# Patient Record
Sex: FEMALE | Race: WHITE | NOT HISPANIC OR LATINO
[De-identification: names, ages, dates, MRNs, and addresses within clinical notes are randomized per-mention and may not be internally consistent; named-entity substitution may affect disease eponyms.]

---

## 2020-11-19 ENCOUNTER — APPOINTMENT (OUTPATIENT)
Dept: FAMILY MEDICINE | Facility: CLINIC | Age: 85
End: 2020-11-19
Payer: MEDICARE

## 2020-11-19 VITALS
SYSTOLIC BLOOD PRESSURE: 132 MMHG | DIASTOLIC BLOOD PRESSURE: 74 MMHG | HEART RATE: 83 BPM | BODY MASS INDEX: 26.2 KG/M2 | HEIGHT: 60.5 IN | WEIGHT: 137 LBS | TEMPERATURE: 97.5 F | OXYGEN SATURATION: 95 %

## 2020-11-19 DIAGNOSIS — Z82.49 FAMILY HISTORY OF ISCHEMIC HEART DISEASE AND OTHER DISEASES OF THE CIRCULATORY SYSTEM: ICD-10-CM

## 2020-11-19 DIAGNOSIS — Z87.39 PERSONAL HISTORY OF OTHER DISEASES OF THE MUSCULOSKELETAL SYSTEM AND CONNECTIVE TISSUE: ICD-10-CM

## 2020-11-19 DIAGNOSIS — Z78.9 OTHER SPECIFIED HEALTH STATUS: ICD-10-CM

## 2020-11-19 PROCEDURE — G0444 DEPRESSION SCREEN ANNUAL: CPT | Mod: 59

## 2020-11-19 PROCEDURE — 99204 OFFICE O/P NEW MOD 45 MIN: CPT | Mod: 25

## 2020-11-19 RX ORDER — VIT C/E/ZN/COPPR/LUTEIN/ZEAXAN 250MG-90MG
CAPSULE ORAL
Refills: 0 | Status: ACTIVE | COMMUNITY

## 2020-11-19 NOTE — HISTORY OF PRESENT ILLNESS
[FreeTextEntry1] : establish care  [de-identified] : 86 year old female with pmhx of HTN, HLD, RA presents to establish care. She states she feels well. Admits to depression due to taking care of her  with dementia. She is unable to get an aide to come to house as it is not covered. She is followed by rheum. \par Also advised she has chronic blood in the urine and had full workup - was advised it is a side effect of humira

## 2020-11-19 NOTE — PLAN
[FreeTextEntry1] : mild depression\par - has tried SSRIs which make her fatigued\par - trial of Wellbutrin\par - f/u in 1 month via phone for update\par \par HTN\par - cont losartan\par \par HLD\par - cont statin\par \par f/u labs\par f/u in 6 months or PRN

## 2020-11-20 LAB
25(OH)D3 SERPL-MCNC: 30.4 NG/ML
ALBUMIN SERPL ELPH-MCNC: 4.1 G/DL
ALP BLD-CCNC: 80 U/L
ALT SERPL-CCNC: 11 U/L
ANION GAP SERPL CALC-SCNC: 12 MMOL/L
AST SERPL-CCNC: 17 U/L
BASOPHILS # BLD AUTO: 0.06 K/UL
BASOPHILS NFR BLD AUTO: 0.7 %
BILIRUB SERPL-MCNC: 0.2 MG/DL
BUN SERPL-MCNC: 22 MG/DL
CALCIUM SERPL-MCNC: 9.7 MG/DL
CHLORIDE SERPL-SCNC: 102 MMOL/L
CHOLEST SERPL-MCNC: 153 MG/DL
CO2 SERPL-SCNC: 29 MMOL/L
CREAT SERPL-MCNC: 1 MG/DL
EOSINOPHIL # BLD AUTO: 0.37 K/UL
EOSINOPHIL NFR BLD AUTO: 4.1 %
ESTIMATED AVERAGE GLUCOSE: 120 MG/DL
GLUCOSE SERPL-MCNC: 113 MG/DL
HBA1C MFR BLD HPLC: 5.8 %
HCT VFR BLD CALC: 41.7 %
HDLC SERPL-MCNC: 52 MG/DL
HGB BLD-MCNC: 13.3 G/DL
IMM GRANULOCYTES NFR BLD AUTO: 0.3 %
LDLC SERPL CALC-MCNC: 64 MG/DL
LYMPHOCYTES # BLD AUTO: 3.6 K/UL
LYMPHOCYTES NFR BLD AUTO: 39.9 %
MAN DIFF?: NORMAL
MCHC RBC-ENTMCNC: 29.6 PG
MCHC RBC-ENTMCNC: 31.9 GM/DL
MCV RBC AUTO: 92.9 FL
MONOCYTES # BLD AUTO: 0.78 K/UL
MONOCYTES NFR BLD AUTO: 8.6 %
NEUTROPHILS # BLD AUTO: 4.18 K/UL
NEUTROPHILS NFR BLD AUTO: 46.4 %
NONHDLC SERPL-MCNC: 101 MG/DL
PLATELET # BLD AUTO: 204 K/UL
POTASSIUM SERPL-SCNC: 4.2 MMOL/L
PROT SERPL-MCNC: 7 G/DL
RBC # BLD: 4.49 M/UL
RBC # FLD: 13.1 %
SODIUM SERPL-SCNC: 143 MMOL/L
TRIGL SERPL-MCNC: 186 MG/DL
TSH SERPL-ACNC: 2.28 UIU/ML
WBC # FLD AUTO: 9.02 K/UL

## 2021-01-05 ENCOUNTER — NON-APPOINTMENT (OUTPATIENT)
Age: 86
End: 2021-01-05

## 2021-01-05 ENCOUNTER — APPOINTMENT (OUTPATIENT)
Dept: FAMILY MEDICINE | Facility: CLINIC | Age: 86
End: 2021-01-05
Payer: MEDICARE

## 2021-01-05 VITALS
HEART RATE: 85 BPM | SYSTOLIC BLOOD PRESSURE: 126 MMHG | WEIGHT: 137 LBS | OXYGEN SATURATION: 96 % | BODY MASS INDEX: 26.2 KG/M2 | DIASTOLIC BLOOD PRESSURE: 72 MMHG | TEMPERATURE: 97.6 F | HEIGHT: 60.5 IN

## 2021-01-05 DIAGNOSIS — G25.0 ESSENTIAL TREMOR: ICD-10-CM

## 2021-01-05 DIAGNOSIS — Z13.31 ENCOUNTER FOR SCREENING FOR DEPRESSION: ICD-10-CM

## 2021-01-05 PROCEDURE — G0444 DEPRESSION SCREEN ANNUAL: CPT | Mod: 59

## 2021-01-05 PROCEDURE — 99214 OFFICE O/P EST MOD 30 MIN: CPT | Mod: 25

## 2021-01-05 PROCEDURE — 99072 ADDL SUPL MATRL&STAF TM PHE: CPT

## 2021-01-05 PROCEDURE — 93000 ELECTROCARDIOGRAM COMPLETE: CPT | Mod: 59

## 2021-01-05 NOTE — PHYSICAL EXAM
[No Acute Distress] : no acute distress [Well-Appearing] : well-appearing [Normal Sclera/Conjunctiva] : normal sclera/conjunctiva [PERRL] : pupils equal round and reactive to light [Normal Outer Ear/Nose] : the outer ears and nose were normal in appearance [Normal TMs] : both tympanic membranes were normal [No Respiratory Distress] : no respiratory distress  [No Accessory Muscle Use] : no accessory muscle use [Clear to Auscultation] : lungs were clear to auscultation bilaterally [Normal Rate] : normal rate  [Regular Rhythm] : with a regular rhythm [Soft] : abdomen soft [Non Tender] : non-tender [Non-distended] : non-distended [Normal Bowel Sounds] : normal bowel sounds [No Rash] : no rash [No Focal Deficits] : no focal deficits [Normal Affect] : the affect was normal [Normal Insight/Judgement] : insight and judgment were intact [de-identified] : + hand tremor bl

## 2021-01-05 NOTE — PLAN
[FreeTextEntry1] : HTN\par - cont current meds\par \par Depression\par - cont wellbutrin 300mg\par \par Essential tremor\par - start low dose propanolol 10mg daily\par \par RBBB\par - found on EKG\par - no previous EKG for comparison\par - cardio referral\par \par f/u in 3 months or PRN

## 2021-01-05 NOTE — HISTORY OF PRESENT ILLNESS
[FreeTextEntry1] : tremors, depression [de-identified] : 86 year old female presents for f/u. Was started on wellbutrin 300mg and feels improvement. She still feel she could feel better, but notices a difference. She has been on medication for 6 weeks. Denies side effects. She does complain of intermittent tremors for past few months. Denies etoh use. No liver failure.\par  She is taking care of her  with dementia and has increased stress from situation

## 2021-02-11 ENCOUNTER — APPOINTMENT (OUTPATIENT)
Dept: FAMILY MEDICINE | Facility: CLINIC | Age: 86
End: 2021-02-11
Payer: MEDICARE

## 2021-02-11 VITALS
BODY MASS INDEX: 26.2 KG/M2 | SYSTOLIC BLOOD PRESSURE: 134 MMHG | HEIGHT: 60.5 IN | WEIGHT: 137 LBS | DIASTOLIC BLOOD PRESSURE: 70 MMHG | TEMPERATURE: 97.4 F | OXYGEN SATURATION: 94 % | RESPIRATION RATE: 16 BRPM | HEART RATE: 93 BPM

## 2021-02-11 DIAGNOSIS — R63.0 ANOREXIA: ICD-10-CM
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
## 2021-02-11 PROCEDURE — 99072 ADDL SUPL MATRL&STAF TM PHE: CPT

## 2021-02-11 PROCEDURE — 99213 OFFICE O/P EST LOW 20 MIN: CPT

## 2021-02-11 NOTE — PLAN
[FreeTextEntry1] : RA\par - trial of medrol dose bianca\par - advised if no improvement to f/u wit her rheumatologist\par \par Lack of appetite\par - possibly due to pain\par - f/u if no improvement\par - consider AE of wellbutrin if persists

## 2021-02-11 NOTE — PHYSICAL EXAM
[No Acute Distress] : no acute distress [Well-Appearing] : well-appearing [Normal Sclera/Conjunctiva] : normal sclera/conjunctiva [PERRL] : pupils equal round and reactive to light [Normal Outer Ear/Nose] : the outer ears and nose were normal in appearance [No Respiratory Distress] : no respiratory distress  [No Accessory Muscle Use] : no accessory muscle use [Clear to Auscultation] : lungs were clear to auscultation bilaterally [Normal Rate] : normal rate  [Regular Rhythm] : with a regular rhythm [Soft] : abdomen soft [Non Tender] : non-tender [Normal Bowel Sounds] : normal bowel sounds [Non-distended] : non-distended [No Joint Swelling] : no joint swelling [Normal Affect] : the affect was normal [Normal Insight/Judgement] : insight and judgment were intact [de-identified] : walks with walker

## 2021-02-11 NOTE — HISTORY OF PRESENT ILLNESS
[FreeTextEntry8] : 86 year old female presents complaining of diffuse joint pain and an RA flareup. States it all started after a fall 1 week ago. She denies htiting her head. She fell forward and landed on her arms. Had gloves on so did not get scratches. Had 2 men help her up immediately. She states falls have triggered RA flares for her in the past and she typically improves with steroids. She is on humira and has a rheumatologist she sees. Complains of lack of appetite since fall as well due to pain

## 2021-02-12 ENCOUNTER — NON-APPOINTMENT (OUTPATIENT)
Age: 86
End: 2021-02-12

## 2021-02-22 ENCOUNTER — NON-APPOINTMENT (OUTPATIENT)
Age: 86
End: 2021-02-22

## 2021-02-23 ENCOUNTER — APPOINTMENT (OUTPATIENT)
Dept: FAMILY MEDICINE | Facility: CLINIC | Age: 86
End: 2021-02-23

## 2021-02-25 ENCOUNTER — APPOINTMENT (OUTPATIENT)
Dept: FAMILY MEDICINE | Facility: CLINIC | Age: 86
End: 2021-02-25
Payer: MEDICARE

## 2021-02-25 PROCEDURE — 99443: CPT

## 2021-02-25 RX ORDER — METHYLPREDNISOLONE 4 MG/1
4 TABLET ORAL
Qty: 1 | Refills: 0 | Status: COMPLETED | COMMUNITY
Start: 2021-02-11 | End: 2021-02-25

## 2021-02-25 RX ORDER — TIZANIDINE 4 MG/1
4 TABLET ORAL
Refills: 0 | Status: COMPLETED | COMMUNITY
End: 2021-02-25

## 2021-02-25 RX ORDER — PROPRANOLOL HYDROCHLORIDE 10 MG/1
10 TABLET ORAL DAILY
Qty: 90 | Refills: 0 | Status: COMPLETED | COMMUNITY
Start: 2021-01-05 | End: 2021-02-25

## 2021-02-25 RX ORDER — PRAVASTATIN SODIUM 40 MG/1
40 TABLET ORAL DAILY
Qty: 3 | Refills: 0 | Status: COMPLETED | COMMUNITY
End: 2021-02-25

## 2021-02-25 RX ORDER — BUPROPION HYDROCHLORIDE 300 MG/1
300 TABLET, EXTENDED RELEASE ORAL
Qty: 90 | Refills: 0 | Status: COMPLETED | COMMUNITY
Start: 2020-11-19 | End: 2021-02-25

## 2021-02-25 NOTE — PLAN
[FreeTextEntry1] : Hospital f/u\par - cont to monitor spO2 off of oxygen\par - monitor symptoms \par - recommend PT to help build up strength\par - can consider [facility] referral\par \par Bereavement\par - pt just lost her \par - advised to f/u if she feels she need medication to help cope\par - may benefit from therapy as well\par

## 2021-02-25 NOTE — HISTORY OF PRESENT ILLNESS
[FreeTextEntry1] : hospital followup for covid [de-identified] : 86 year old female presents for f/u via phone call. She was recently discharged from [facility] for [facility] where she waws admitted for 6 days. She was not on a respirator and required O2.\par States during her hospitalization her  passed away. She has had difficulty processing this as she is home recovering herself. \par States she has O2 at home but her spO2 is 97 off of oxygen. \par Was supposed to have home RN and PT but states noone showed up. \par She is overall improving day by day. She is trying to build up her strength

## 2021-03-08 ENCOUNTER — NON-APPOINTMENT (OUTPATIENT)
Age: 86
End: 2021-03-08

## 2021-03-09 DIAGNOSIS — J12.82 COVID-19: ICD-10-CM

## 2021-03-09 DIAGNOSIS — U07.1 COVID-19: ICD-10-CM

## 2021-03-29 ENCOUNTER — APPOINTMENT (OUTPATIENT)
Dept: FAMILY MEDICINE | Facility: CLINIC | Age: 86
End: 2021-03-29
Payer: MEDICARE

## 2021-03-29 VITALS
TEMPERATURE: 97.2 F | WEIGHT: 130 LBS | HEART RATE: 101 BPM | SYSTOLIC BLOOD PRESSURE: 124 MMHG | OXYGEN SATURATION: 95 % | BODY MASS INDEX: 24.87 KG/M2 | DIASTOLIC BLOOD PRESSURE: 72 MMHG | HEIGHT: 60.5 IN

## 2021-03-29 DIAGNOSIS — H91.90 UNSPECIFIED HEARING LOSS, UNSPECIFIED EAR: ICD-10-CM

## 2021-03-29 DIAGNOSIS — H61.21 IMPACTED CERUMEN, RIGHT EAR: ICD-10-CM

## 2021-03-29 DIAGNOSIS — Z12.83 ENCOUNTER FOR SCREENING FOR MALIGNANT NEOPLASM OF SKIN: ICD-10-CM

## 2021-03-29 PROCEDURE — 99072 ADDL SUPL MATRL&STAF TM PHE: CPT

## 2021-03-29 PROCEDURE — 99214 OFFICE O/P EST MOD 30 MIN: CPT

## 2021-03-29 NOTE — HISTORY OF PRESENT ILLNESS
[FreeTextEntry1] : f/u [de-identified] : 86 year old female presents for f/u. She complains of  generalized weakness since her hospitalization from covid last month and would like PT.\par  She also has inquiries about starting humira and the covid-19 vaccination\par She complains of difficulty hearing, has f/u wiith audiologist to evaluate hearing aids\par  She also has concerns of skin lesions on face. \par She complains of anxiety and depression since losing her  and having to deal with financial decisions coming up. She is interested in medication and therapy

## 2021-03-29 NOTE — PLAN
[FreeTextEntry1] : HTN\par - controlled\par - cont medication\par \par Depression\par - start lexapro\par - f/u with behavioral health for therapy\par \par Impacted cerumen\par - tried to irrigate ears without success. Advised debrox at home for a few weeks. Can follow back up in office afterwards for evaluation and irrigation\par \par Hearing loss\par - possibly related to cerumen impaction\par - f/u with audiologist to evaluate hearing aids\par \par Hx of covid-19, weakness\par - PT referral \par \par Skin lesions\par - derm referral\par \par f/u in 3 months or PRN

## 2021-03-29 NOTE — PHYSICAL EXAM
[No Acute Distress] : no acute distress [Well-Appearing] : well-appearing [Normal Sclera/Conjunctiva] : normal sclera/conjunctiva [PERRL] : pupils equal round and reactive to light [Normal Outer Ear/Nose] : the outer ears and nose were normal in appearance [No Respiratory Distress] : no respiratory distress  [No Accessory Muscle Use] : no accessory muscle use [Clear to Auscultation] : lungs were clear to auscultation bilaterally [Normal Rate] : normal rate  [Regular Rhythm] : with a regular rhythm [Normal Affect] : the affect was normal [Normal Insight/Judgement] : insight and judgment were intact [de-identified] : + cerumen impaction on R

## 2021-04-05 ENCOUNTER — APPOINTMENT (OUTPATIENT)
Dept: NEUROLOGY | Facility: CLINIC | Age: 86
End: 2021-04-05

## 2021-04-08 ENCOUNTER — NON-APPOINTMENT (OUTPATIENT)
Age: 86
End: 2021-04-08

## 2021-04-23 ENCOUNTER — TRANSCRIPTION ENCOUNTER (OUTPATIENT)
Age: 86
End: 2021-04-23

## 2021-04-27 ENCOUNTER — TRANSCRIPTION ENCOUNTER (OUTPATIENT)
Age: 86
End: 2021-04-27

## 2021-05-11 ENCOUNTER — TRANSCRIPTION ENCOUNTER (OUTPATIENT)
Age: 86
End: 2021-05-11

## 2021-05-17 ENCOUNTER — APPOINTMENT (OUTPATIENT)
Dept: FAMILY MEDICINE | Facility: CLINIC | Age: 86
End: 2021-05-17
Payer: MEDICARE

## 2021-05-17 DIAGNOSIS — J06.9 ACUTE UPPER RESPIRATORY INFECTION, UNSPECIFIED: ICD-10-CM

## 2021-05-17 PROCEDURE — 99442: CPT

## 2021-05-17 RX ORDER — SERTRALINE 25 MG/1
25 TABLET, FILM COATED ORAL DAILY
Qty: 45 | Refills: 0 | Status: COMPLETED | COMMUNITY
Start: 2021-04-29 | End: 2021-05-17

## 2021-05-17 NOTE — HISTORY OF PRESENT ILLNESS
[Home] : at home, [unfilled] , at the time of the visit. [Medical Office: ([facility])___] : at the medical office located in  [Verbal consent obtained from patient] : the patient, [unfilled] [de-identified] : 86 year old female presents via telephone for f/u. She is still struggling since being discharged from the hospital for covid and losing her  at the same time. She has been doing PT and has still not been able to get back to her "precovid" self. \par She has been suffering from depression and has been taking to mental health counselor from our office which is helping. They have decided not to start medication.\par She complains of diffuse joint pain and is followed by rheumatologist. Was feeling better on prednisone but once it stops she starts to have pain again. \par She also complains of a cold with runny nose. States she is starting to feel better from this. Has questions about getting the covid-19 vaccination, she is 90 days out from her covid infection

## 2021-05-17 NOTE — PLAN
[FreeTextEntry1] : URI\par - advised to continue supportive measures including rest, salt water gargles, tea with honey\par - Flonase daily\par - f/u if no improvement or worsening symptoms\par - would recommend waiting on covid-19 vaccination until she is feeling better\par \par Bereavement\par - cont f/u with counseling\par \par RA\par - advised to f/u with rheum to discuss medication options\par \par Hx of covid\par - advised pt to be patient with herself.\par - cont PT and advance activities as tolerated

## 2021-05-17 NOTE — REVIEW OF SYSTEMS
[Fever] : no fever [Chills] : no chills [Fatigue] : fatigue [Discharge] : no discharge [Vision Problems] : no vision problems [Earache] : no earache [Nasal Discharge] : nasal discharge [Sore Throat] : no sore throat [Postnasal Drip] : postnasal drip [Chest Pain] : no chest pain [Palpitations] : no palpitations [Shortness Of Breath] : no shortness of breath [Wheezing] : no wheezing [Cough] : no cough [Joint Pain] : joint pain [Depression] : depression

## 2021-05-24 ENCOUNTER — TRANSCRIPTION ENCOUNTER (OUTPATIENT)
Age: 86
End: 2021-05-24

## 2021-06-03 ENCOUNTER — TRANSCRIPTION ENCOUNTER (OUTPATIENT)
Age: 86
End: 2021-06-03

## 2021-06-08 ENCOUNTER — TRANSCRIPTION ENCOUNTER (OUTPATIENT)
Age: 86
End: 2021-06-08

## 2021-06-21 ENCOUNTER — RX RENEWAL (OUTPATIENT)
Age: 86
End: 2021-06-21

## 2021-06-22 ENCOUNTER — TRANSCRIPTION ENCOUNTER (OUTPATIENT)
Age: 86
End: 2021-06-22

## 2021-06-29 ENCOUNTER — TRANSCRIPTION ENCOUNTER (OUTPATIENT)
Age: 86
End: 2021-06-29

## 2021-07-13 ENCOUNTER — TRANSCRIPTION ENCOUNTER (OUTPATIENT)
Age: 86
End: 2021-07-13

## 2021-07-19 ENCOUNTER — APPOINTMENT (OUTPATIENT)
Dept: FAMILY MEDICINE | Facility: CLINIC | Age: 86
End: 2021-07-19
Payer: MEDICARE

## 2021-07-19 VITALS
HEIGHT: 60.5 IN | OXYGEN SATURATION: 95 % | BODY MASS INDEX: 25.44 KG/M2 | WEIGHT: 133 LBS | HEART RATE: 102 BPM | TEMPERATURE: 97 F | DIASTOLIC BLOOD PRESSURE: 74 MMHG | SYSTOLIC BLOOD PRESSURE: 124 MMHG

## 2021-07-19 DIAGNOSIS — M54.6 PAIN IN THORACIC SPINE: ICD-10-CM

## 2021-07-19 PROCEDURE — 99072 ADDL SUPL MATRL&STAF TM PHE: CPT

## 2021-07-19 PROCEDURE — 99213 OFFICE O/P EST LOW 20 MIN: CPT

## 2021-07-19 RX ORDER — CLONAZEPAM 0.25 MG/1
0.25 TABLET, ORALLY DISINTEGRATING ORAL
Qty: 14 | Refills: 0 | Status: COMPLETED | COMMUNITY
Start: 2021-06-24 | End: 2021-07-19

## 2021-07-19 RX ORDER — ESCITALOPRAM OXALATE 10 MG/1
10 TABLET ORAL
Qty: 90 | Refills: 0 | Status: COMPLETED | COMMUNITY
Start: 2021-03-29 | End: 2021-07-19

## 2021-07-19 NOTE — PHYSICAL EXAM
[No Acute Distress] : no acute distress [Well-Appearing] : well-appearing [Normal Sclera/Conjunctiva] : normal sclera/conjunctiva [PERRL] : pupils equal round and reactive to light [Normal Outer Ear/Nose] : the outer ears and nose were normal in appearance [No Respiratory Distress] : no respiratory distress  [No Accessory Muscle Use] : no accessory muscle use [Clear to Auscultation] : lungs were clear to auscultation bilaterally [Normal Rate] : normal rate  [Regular Rhythm] : with a regular rhythm [Soft] : abdomen soft [Non Tender] : non-tender [Non-distended] : non-distended [Normal Bowel Sounds] : normal bowel sounds [No Rash] : no rash [No Focal Deficits] : no focal deficits [Normal Affect] : the affect was normal [Normal Insight/Judgement] : insight and judgment were intact [de-identified] : neg murphys sign

## 2021-07-19 NOTE — HISTORY OF PRESENT ILLNESS
[FreeTextEntry8] : 86 year old female presents for concern for gallstones as she had them in the past. Patient states her pain is in her upper back, localized and non radiating. States it throbs. Improved with tylenol. Also improves after laying flat when sleeping. No pain in the morning and worsens as the day goes on. Does not worsen with PO intake. She does have a hx of arthritis and is followed by rheum. Spoke to rheum today who gave her some oxycodone to use. She also has increased fatigue and leg pain but improves a little every day. Can do one activity before needing a break

## 2021-08-31 ENCOUNTER — APPOINTMENT (OUTPATIENT)
Dept: FAMILY MEDICINE | Facility: CLINIC | Age: 86
End: 2021-08-31
Payer: MEDICARE

## 2021-08-31 VITALS
HEIGHT: 60.5 IN | TEMPERATURE: 97.3 F | SYSTOLIC BLOOD PRESSURE: 128 MMHG | DIASTOLIC BLOOD PRESSURE: 80 MMHG | OXYGEN SATURATION: 96 % | BODY MASS INDEX: 25.24 KG/M2 | HEART RATE: 111 BPM | WEIGHT: 132 LBS

## 2021-08-31 PROCEDURE — 99214 OFFICE O/P EST MOD 30 MIN: CPT

## 2021-08-31 NOTE — PLAN
[FreeTextEntry1] : Weakness/difficulty ambulating\par - note provided for pt to get out of lease. Do believe it would benefit pt to live with daughter\par \par Labs\par - to be followed at [facility]\par \par Edema\par - f/u at next visit after labs done

## 2021-08-31 NOTE — PHYSICAL EXAM
[Normal Sclera/Conjunctiva] : normal sclera/conjunctiva [EOMI] : extraocular movements intact [No JVD] : no jugular venous distention [No Lymphadenopathy] : no lymphadenopathy [Supple] : supple [Normal Rate] : normal rate  [Regular Rhythm] : with a regular rhythm [Normal S1, S2] : normal S1 and S2 [No Carotid Bruits] : no carotid bruits [Normal] : normal gait, coordination grossly intact, no focal deficits and deep tendon reflexes were 2+ and symmetric [de-identified] : fine bibasilar crackles posteriorly [de-identified] : + holosystolic murmur [de-identified] : trace-1+ pitting edema of bilateral LE

## 2021-08-31 NOTE — HISTORY OF PRESENT ILLNESS
[FreeTextEntry1] : Follow up [de-identified] : 88yo F, with PMH/o , presenting with inability to walk that has persisted since her hospitalization in 2/2021 due to COVID-19. She would like to move in with her daughter for additional assistance.Has been having difficulty ambulating and requires a walker. Her bathroom is also not handicap accessible. \par Also, patient notes that since her hospitalization she has been prescribed losartan where she prior to admission she had been taking combined ARB-HCTZ for ~20yrs. She now feels that she is retaining fluid again around her ankles.

## 2021-09-16 LAB
BASOPHILS # BLD AUTO: 0.08 K/UL
BASOPHILS NFR BLD AUTO: 0.9 %
EOSINOPHIL # BLD AUTO: 0.87 K/UL
EOSINOPHIL NFR BLD AUTO: 9.8 %
HCT VFR BLD CALC: 40.1 %
HGB BLD-MCNC: 12.9 G/DL
IMM GRANULOCYTES NFR BLD AUTO: 0.3 %
LYMPHOCYTES # BLD AUTO: 3.12 K/UL
LYMPHOCYTES NFR BLD AUTO: 35.1 %
MAN DIFF?: NORMAL
MCHC RBC-ENTMCNC: 29.1 PG
MCHC RBC-ENTMCNC: 32.2 GM/DL
MCV RBC AUTO: 90.5 FL
MONOCYTES # BLD AUTO: 0.74 K/UL
MONOCYTES NFR BLD AUTO: 8.3 %
NEUTROPHILS # BLD AUTO: 4.06 K/UL
NEUTROPHILS NFR BLD AUTO: 45.6 %
PLATELET # BLD AUTO: 225 K/UL
RBC # BLD: 4.43 M/UL
RBC # FLD: 14.6 %
WBC # FLD AUTO: 8.9 K/UL

## 2021-09-17 LAB
25(OH)D3 SERPL-MCNC: 28.8 NG/ML
ALBUMIN SERPL ELPH-MCNC: 4.2 G/DL
ALP BLD-CCNC: 61 U/L
ALT SERPL-CCNC: 11 U/L
ANION GAP SERPL CALC-SCNC: 20 MMOL/L
AST SERPL-CCNC: 16 U/L
BILIRUB SERPL-MCNC: 0.4 MG/DL
BUN SERPL-MCNC: 17 MG/DL
CALCIUM SERPL-MCNC: 8.9 MG/DL
CHLORIDE SERPL-SCNC: 102 MMOL/L
CHOLEST SERPL-MCNC: 182 MG/DL
CO2 SERPL-SCNC: 21 MMOL/L
CREAT SERPL-MCNC: 0.9 MG/DL
ESTIMATED AVERAGE GLUCOSE: 117 MG/DL
GLUCOSE SERPL-MCNC: 100 MG/DL
HBA1C MFR BLD HPLC: 5.7 %
HDLC SERPL-MCNC: 58 MG/DL
LDLC SERPL CALC-MCNC: 99 MG/DL
NONHDLC SERPL-MCNC: 124 MG/DL
POTASSIUM SERPL-SCNC: 4.1 MMOL/L
PROT SERPL-MCNC: 6.8 G/DL
SODIUM SERPL-SCNC: 143 MMOL/L
TRIGL SERPL-MCNC: 124 MG/DL
TSH SERPL-ACNC: 1.5 UIU/ML

## 2021-09-20 ENCOUNTER — RX RENEWAL (OUTPATIENT)
Age: 86
End: 2021-09-20

## 2021-09-26 ENCOUNTER — INPATIENT (INPATIENT)
Facility: HOSPITAL | Age: 86
LOS: 6 days | Discharge: HOME HEALTH | DRG: 871 | End: 2021-10-03
Attending: INTERNAL MEDICINE | Admitting: STUDENT IN AN ORGANIZED HEALTH CARE EDUCATION/TRAINING PROGRAM
Payer: MEDICARE

## 2021-09-26 VITALS
HEART RATE: 107 BPM | DIASTOLIC BLOOD PRESSURE: 60 MMHG | HEIGHT: 65 IN | WEIGHT: 134.92 LBS | RESPIRATION RATE: 18 BRPM | TEMPERATURE: 100 F | SYSTOLIC BLOOD PRESSURE: 128 MMHG | OXYGEN SATURATION: 93 %

## 2021-09-26 DIAGNOSIS — N12 TUBULO-INTERSTITIAL NEPHRITIS, NOT SPECIFIED AS ACUTE OR CHRONIC: ICD-10-CM

## 2021-09-26 LAB
ADD ON TEST-SPECIMEN IN LAB: SIGNIFICANT CHANGE UP
ALBUMIN SERPL ELPH-MCNC: 3.4 G/DL — SIGNIFICANT CHANGE UP (ref 3.3–5)
ALP SERPL-CCNC: 75 U/L — SIGNIFICANT CHANGE UP (ref 40–120)
ALT FLD-CCNC: 23 U/L — SIGNIFICANT CHANGE UP (ref 12–78)
ANION GAP SERPL CALC-SCNC: 6 MMOL/L — SIGNIFICANT CHANGE UP (ref 5–17)
AST SERPL-CCNC: 24 U/L — SIGNIFICANT CHANGE UP (ref 15–37)
BASOPHILS # BLD AUTO: 0 K/UL — SIGNIFICANT CHANGE UP (ref 0–0.2)
BASOPHILS NFR BLD AUTO: 0 % — SIGNIFICANT CHANGE UP (ref 0–2)
BILIRUB SERPL-MCNC: 0.7 MG/DL — SIGNIFICANT CHANGE UP (ref 0.2–1.2)
BUN SERPL-MCNC: 20 MG/DL — SIGNIFICANT CHANGE UP (ref 7–23)
CALCIUM SERPL-MCNC: 9.2 MG/DL — SIGNIFICANT CHANGE UP (ref 8.5–10.1)
CHLORIDE SERPL-SCNC: 100 MMOL/L — SIGNIFICANT CHANGE UP (ref 96–108)
CO2 SERPL-SCNC: 28 MMOL/L — SIGNIFICANT CHANGE UP (ref 22–31)
CREAT SERPL-MCNC: 1.06 MG/DL — SIGNIFICANT CHANGE UP (ref 0.5–1.3)
EOSINOPHIL # BLD AUTO: 0 K/UL — SIGNIFICANT CHANGE UP (ref 0–0.5)
EOSINOPHIL NFR BLD AUTO: 0 % — SIGNIFICANT CHANGE UP (ref 0–6)
GLUCOSE SERPL-MCNC: 132 MG/DL — HIGH (ref 70–99)
HCT VFR BLD CALC: 41.2 % — SIGNIFICANT CHANGE UP (ref 34.5–45)
HGB BLD-MCNC: 13.4 G/DL — SIGNIFICANT CHANGE UP (ref 11.5–15.5)
LYMPHOCYTES # BLD AUTO: 1.38 K/UL — SIGNIFICANT CHANGE UP (ref 1–3.3)
LYMPHOCYTES # BLD AUTO: 6 % — LOW (ref 13–44)
MAGNESIUM SERPL-MCNC: 1.8 MG/DL — SIGNIFICANT CHANGE UP (ref 1.6–2.6)
MCHC RBC-ENTMCNC: 29 PG — SIGNIFICANT CHANGE UP (ref 27–34)
MCHC RBC-ENTMCNC: 32.5 GM/DL — SIGNIFICANT CHANGE UP (ref 32–36)
MCV RBC AUTO: 89.2 FL — SIGNIFICANT CHANGE UP (ref 80–100)
MONOCYTES # BLD AUTO: 2.76 K/UL — HIGH (ref 0–0.9)
MONOCYTES NFR BLD AUTO: 12 % — SIGNIFICANT CHANGE UP (ref 2–14)
NEUTROPHILS # BLD AUTO: 18.65 K/UL — HIGH (ref 1.8–7.4)
NEUTROPHILS NFR BLD AUTO: 79 % — HIGH (ref 43–77)
NRBC # BLD: SIGNIFICANT CHANGE UP /100 WBCS (ref 0–0)
PLATELET # BLD AUTO: 189 K/UL — SIGNIFICANT CHANGE UP (ref 150–400)
POTASSIUM SERPL-MCNC: 3.7 MMOL/L — SIGNIFICANT CHANGE UP (ref 3.5–5.3)
POTASSIUM SERPL-SCNC: 3.7 MMOL/L — SIGNIFICANT CHANGE UP (ref 3.5–5.3)
PROT SERPL-MCNC: 7.9 GM/DL — SIGNIFICANT CHANGE UP (ref 6–8.3)
RBC # BLD: 4.62 M/UL — SIGNIFICANT CHANGE UP (ref 3.8–5.2)
RBC # FLD: 14.2 % — SIGNIFICANT CHANGE UP (ref 10.3–14.5)
SODIUM SERPL-SCNC: 134 MMOL/L — LOW (ref 135–145)
TROPONIN I SERPL-MCNC: 0.03 NG/ML — SIGNIFICANT CHANGE UP (ref 0.01–0.04)
TROPONIN I SERPL-MCNC: 0.03 NG/ML — SIGNIFICANT CHANGE UP (ref 0.01–0.04)
WBC # BLD: 23.02 K/UL — HIGH (ref 3.8–10.5)
WBC # FLD AUTO: 23.02 K/UL — HIGH (ref 3.8–10.5)

## 2021-09-26 PROCEDURE — 85027 COMPLETE CBC AUTOMATED: CPT

## 2021-09-26 PROCEDURE — 74177 CT ABD & PELVIS W/CONTRAST: CPT | Mod: 26,MA

## 2021-09-26 PROCEDURE — 93010 ELECTROCARDIOGRAM REPORT: CPT

## 2021-09-26 PROCEDURE — 97116 GAIT TRAINING THERAPY: CPT | Mod: GP

## 2021-09-26 PROCEDURE — 71045 X-RAY EXAM CHEST 1 VIEW: CPT | Mod: 26

## 2021-09-26 PROCEDURE — 86769 SARS-COV-2 COVID-19 ANTIBODY: CPT

## 2021-09-26 PROCEDURE — 99285 EMERGENCY DEPT VISIT HI MDM: CPT

## 2021-09-26 PROCEDURE — 83036 HEMOGLOBIN GLYCOSYLATED A1C: CPT

## 2021-09-26 PROCEDURE — 85730 THROMBOPLASTIN TIME PARTIAL: CPT

## 2021-09-26 PROCEDURE — 87150 DNA/RNA AMPLIFIED PROBE: CPT

## 2021-09-26 PROCEDURE — 80048 BASIC METABOLIC PNL TOTAL CA: CPT

## 2021-09-26 PROCEDURE — 97162 PT EVAL MOD COMPLEX 30 MIN: CPT | Mod: GP

## 2021-09-26 PROCEDURE — 97530 THERAPEUTIC ACTIVITIES: CPT | Mod: GP

## 2021-09-26 PROCEDURE — 80053 COMPREHEN METABOLIC PANEL: CPT

## 2021-09-26 PROCEDURE — 36415 COLL VENOUS BLD VENIPUNCTURE: CPT

## 2021-09-26 PROCEDURE — 85610 PROTHROMBIN TIME: CPT

## 2021-09-26 PROCEDURE — 85025 COMPLETE CBC W/AUTO DIFF WBC: CPT

## 2021-09-26 PROCEDURE — 83605 ASSAY OF LACTIC ACID: CPT

## 2021-09-26 PROCEDURE — 87040 BLOOD CULTURE FOR BACTERIA: CPT

## 2021-09-26 PROCEDURE — 93306 TTE W/DOPPLER COMPLETE: CPT

## 2021-09-26 PROCEDURE — 87186 SC STD MICRODIL/AGAR DIL: CPT

## 2021-09-26 PROCEDURE — 80061 LIPID PANEL: CPT

## 2021-09-26 RX ORDER — CEFTRIAXONE 500 MG/1
1000 INJECTION, POWDER, FOR SOLUTION INTRAMUSCULAR; INTRAVENOUS ONCE
Refills: 0 | Status: DISCONTINUED | OUTPATIENT
Start: 2021-09-26 | End: 2021-09-26

## 2021-09-26 RX ORDER — SODIUM CHLORIDE 9 MG/ML
1000 INJECTION INTRAMUSCULAR; INTRAVENOUS; SUBCUTANEOUS ONCE
Refills: 0 | Status: COMPLETED | OUTPATIENT
Start: 2021-09-26 | End: 2021-09-26

## 2021-09-26 RX ORDER — CEFTRIAXONE 500 MG/1
1000 INJECTION, POWDER, FOR SOLUTION INTRAMUSCULAR; INTRAVENOUS ONCE
Refills: 0 | Status: COMPLETED | OUTPATIENT
Start: 2021-09-26 | End: 2021-09-26

## 2021-09-26 RX ORDER — ACETAMINOPHEN 500 MG
1000 TABLET ORAL ONCE
Refills: 0 | Status: COMPLETED | OUTPATIENT
Start: 2021-09-26 | End: 2021-09-26

## 2021-09-26 RX ORDER — ONDANSETRON 8 MG/1
4 TABLET, FILM COATED ORAL EVERY 6 HOURS
Refills: 0 | Status: DISCONTINUED | OUTPATIENT
Start: 2021-09-26 | End: 2021-10-03

## 2021-09-26 RX ORDER — ACETAMINOPHEN 500 MG
650 TABLET ORAL EVERY 6 HOURS
Refills: 0 | Status: DISCONTINUED | OUTPATIENT
Start: 2021-09-26 | End: 2021-10-03

## 2021-09-26 RX ADMIN — Medication 1000 MILLIGRAM(S): at 22:40

## 2021-09-26 RX ADMIN — SODIUM CHLORIDE 1000 MILLILITER(S): 9 INJECTION INTRAMUSCULAR; INTRAVENOUS; SUBCUTANEOUS at 21:35

## 2021-09-26 NOTE — ED ADULT TRIAGE NOTE - CHIEF COMPLAINT QUOTE
Pt with c/o of weakness x 2 days.  Pt states she has been feeling weak while walking and having to lower herself to ground to avoid falling.  Also states she has not been eating/drinking normally the past couple days.  Endorses multiple episodes of diarrhea.

## 2021-09-26 NOTE — ED PROVIDER NOTE - PROGRESS NOTE DETAILS
JG:  Received signout from Dr. Patterson to follow up CT which showed cystitis, ureteritis and likely pyelonephritis.  Patient given rocephin already for empiric antibiotic.  Vitals stable.  2nd L of NSS infusing.  Patient to be admitted to hospitalist Dr. Mendoza.  Discussed case and patient is approved to move.

## 2021-09-26 NOTE — ED PROVIDER NOTE - OBJECTIVE STATEMENT
87 year old female with PMHx of HTN, COVID, high cholesterol, arthritis on Humira presents to the ED c/o generalized weakness. Pt reports she that over the last 2 days she has been feeling weakness to the point where she has to lower herself to the ground and cannot get back up. Pt reports eating less, but is still drinking fluids. Pt states that she has had bowel and bladder accidents because she has felt so weak to the point that she cannot make it to the bathroom in time. Denies HA, CP, abd pain, n/v/d, fever. Pt is COVID vaccinated.

## 2021-09-26 NOTE — ED ADULT NURSE NOTE - OBJECTIVE STATEMENT
Pt presents to er with complaints of generalized weakness and not feeling well for past 2 days, pt reports she was on the floor for 9hrs yesterday and was able to make it back to her bed, states she was on the floor today and was not able to get up at all needing an ambulance, pt reports history of COVID and lost her  to COVID and lives alone, pt denies chest pain, sob, dizziness at this time, safety maintained with stretcher in lowest position, will continue to monitor.

## 2021-09-26 NOTE — ED ADULT NURSE REASSESSMENT NOTE - NS ED NURSE REASSESS COMMENT FT1
Received pt from Mellisa GENAO. Pt A+Ox4, requesting tylenol for "arthritis pain". Dr. Yesenia blanco.

## 2021-09-26 NOTE — ED PROVIDER NOTE - ENMT, MLM
Airway patent, Nasal mucosa clear. Mouth with dry mucous membranes. Throat has no vesicles, no oropharyngeal exudates and uvula is midline. Airway patent, Nasal mucosa clear. Mouth with dry mucous membranes.

## 2021-09-27 ENCOUNTER — APPOINTMENT (OUTPATIENT)
Dept: FAMILY MEDICINE | Facility: CLINIC | Age: 86
End: 2021-09-27

## 2021-09-27 LAB
A1C WITH ESTIMATED AVERAGE GLUCOSE RESULT: 5.8 % — HIGH (ref 4–5.6)
ALBUMIN SERPL ELPH-MCNC: 2.8 G/DL — LOW (ref 3.3–5)
ALP SERPL-CCNC: 65 U/L — SIGNIFICANT CHANGE UP (ref 40–120)
ALT FLD-CCNC: 19 U/L — SIGNIFICANT CHANGE UP (ref 12–78)
ANION GAP SERPL CALC-SCNC: 8 MMOL/L — SIGNIFICANT CHANGE UP (ref 5–17)
APPEARANCE UR: ABNORMAL
APTT BLD: 31.2 SEC — SIGNIFICANT CHANGE UP (ref 27.5–35.5)
AST SERPL-CCNC: 19 U/L — SIGNIFICANT CHANGE UP (ref 15–37)
BASOPHILS # BLD AUTO: 0 K/UL — SIGNIFICANT CHANGE UP (ref 0–0.2)
BASOPHILS NFR BLD AUTO: 0 % — SIGNIFICANT CHANGE UP (ref 0–2)
BILIRUB SERPL-MCNC: 0.5 MG/DL — SIGNIFICANT CHANGE UP (ref 0.2–1.2)
BILIRUB UR-MCNC: NEGATIVE — SIGNIFICANT CHANGE UP
BUN SERPL-MCNC: 17 MG/DL — SIGNIFICANT CHANGE UP (ref 7–23)
CALCIUM SERPL-MCNC: 7.8 MG/DL — LOW (ref 8.5–10.1)
CHLORIDE SERPL-SCNC: 105 MMOL/L — SIGNIFICANT CHANGE UP (ref 96–108)
CHOLEST SERPL-MCNC: 121 MG/DL — SIGNIFICANT CHANGE UP
CO2 SERPL-SCNC: 24 MMOL/L — SIGNIFICANT CHANGE UP (ref 22–31)
COLOR SPEC: YELLOW — SIGNIFICANT CHANGE UP
COVID-19 SPIKE DOMAIN AB INTERP: POSITIVE
COVID-19 SPIKE DOMAIN ANTIBODY RESULT: 199 U/ML — HIGH
CREAT SERPL-MCNC: 0.94 MG/DL — SIGNIFICANT CHANGE UP (ref 0.5–1.3)
DIFF PNL FLD: ABNORMAL
E COLI DNA BLD POS QL NAA+NON-PROBE: SIGNIFICANT CHANGE UP
EOSINOPHIL # BLD AUTO: 0 K/UL — SIGNIFICANT CHANGE UP (ref 0–0.5)
EOSINOPHIL NFR BLD AUTO: 0 % — SIGNIFICANT CHANGE UP (ref 0–6)
ESTIMATED AVERAGE GLUCOSE: 120 MG/DL — HIGH (ref 68–114)
GLUCOSE SERPL-MCNC: 104 MG/DL — HIGH (ref 70–99)
GLUCOSE UR QL: NEGATIVE MG/DL — SIGNIFICANT CHANGE UP
GRAM STN FLD: SIGNIFICANT CHANGE UP
GRAM STN FLD: SIGNIFICANT CHANGE UP
HCT VFR BLD CALC: 36 % — SIGNIFICANT CHANGE UP (ref 34.5–45)
HDLC SERPL-MCNC: 55 MG/DL — SIGNIFICANT CHANGE UP
HGB BLD-MCNC: 11.7 G/DL — SIGNIFICANT CHANGE UP (ref 11.5–15.5)
INR BLD: 1.4 RATIO — HIGH (ref 0.88–1.16)
KETONES UR-MCNC: NEGATIVE — SIGNIFICANT CHANGE UP
LACTATE SERPL-SCNC: 1.1 MMOL/L — SIGNIFICANT CHANGE UP (ref 0.7–2)
LEUKOCYTE ESTERASE UR-ACNC: ABNORMAL
LIPID PNL WITH DIRECT LDL SERPL: 48 MG/DL — SIGNIFICANT CHANGE UP
LYMPHOCYTES # BLD AUTO: 1.31 K/UL — SIGNIFICANT CHANGE UP (ref 1–3.3)
LYMPHOCYTES # BLD AUTO: 6 % — LOW (ref 13–44)
MCHC RBC-ENTMCNC: 29.1 PG — SIGNIFICANT CHANGE UP (ref 27–34)
MCHC RBC-ENTMCNC: 32.5 GM/DL — SIGNIFICANT CHANGE UP (ref 32–36)
MCV RBC AUTO: 89.6 FL — SIGNIFICANT CHANGE UP (ref 80–100)
METHOD TYPE: SIGNIFICANT CHANGE UP
MONOCYTES # BLD AUTO: 1.52 K/UL — HIGH (ref 0–0.9)
MONOCYTES NFR BLD AUTO: 7 % — SIGNIFICANT CHANGE UP (ref 2–14)
NEUTROPHILS # BLD AUTO: 18.94 K/UL — HIGH (ref 1.8–7.4)
NEUTROPHILS NFR BLD AUTO: 87 % — HIGH (ref 43–77)
NITRITE UR-MCNC: POSITIVE
NON HDL CHOLESTEROL: 66 MG/DL — SIGNIFICANT CHANGE UP
NRBC # BLD: SIGNIFICANT CHANGE UP /100 WBCS (ref 0–0)
PH UR: 6.5 — SIGNIFICANT CHANGE UP (ref 5–8)
PLATELET # BLD AUTO: 171 K/UL — SIGNIFICANT CHANGE UP (ref 150–400)
POTASSIUM SERPL-MCNC: 3.2 MMOL/L — LOW (ref 3.5–5.3)
POTASSIUM SERPL-SCNC: 3.2 MMOL/L — LOW (ref 3.5–5.3)
PROT SERPL-MCNC: 6.5 GM/DL — SIGNIFICANT CHANGE UP (ref 6–8.3)
PROT UR-MCNC: 100 MG/DL
PROTHROM AB SERPL-ACNC: 16 SEC — HIGH (ref 10.6–13.6)
RBC # BLD: 4.02 M/UL — SIGNIFICANT CHANGE UP (ref 3.8–5.2)
RBC # FLD: 14.3 % — SIGNIFICANT CHANGE UP (ref 10.3–14.5)
SARS-COV-2 IGG+IGM SERPL QL IA: 199 U/ML — HIGH
SARS-COV-2 IGG+IGM SERPL QL IA: POSITIVE
SARS-COV-2 RNA SPEC QL NAA+PROBE: SIGNIFICANT CHANGE UP
SODIUM SERPL-SCNC: 137 MMOL/L — SIGNIFICANT CHANGE UP (ref 135–145)
SP GR SPEC: 1 — LOW (ref 1.01–1.02)
SPECIMEN SOURCE: SIGNIFICANT CHANGE UP
SPECIMEN SOURCE: SIGNIFICANT CHANGE UP
TRIGL SERPL-MCNC: 89 MG/DL — SIGNIFICANT CHANGE UP
UROBILINOGEN FLD QL: NEGATIVE MG/DL — SIGNIFICANT CHANGE UP
WBC # BLD: 21.77 K/UL — HIGH (ref 3.8–10.5)
WBC # FLD AUTO: 21.77 K/UL — HIGH (ref 3.8–10.5)

## 2021-09-27 PROCEDURE — 99223 1ST HOSP IP/OBS HIGH 75: CPT | Mod: GC

## 2021-09-27 PROCEDURE — 93306 TTE W/DOPPLER COMPLETE: CPT | Mod: 26

## 2021-09-27 PROCEDURE — 12345: CPT | Mod: NC,GC

## 2021-09-27 RX ORDER — LOSARTAN POTASSIUM 100 MG/1
0 TABLET, FILM COATED ORAL
Qty: 0 | Refills: 0 | DISCHARGE

## 2021-09-27 RX ORDER — ADALIMUMAB 40MG/0.8ML
0 KIT SUBCUTANEOUS
Qty: 0 | Refills: 0 | DISCHARGE

## 2021-09-27 RX ORDER — AZITHROMYCIN 500 MG/1
500 TABLET, FILM COATED ORAL EVERY 24 HOURS
Refills: 0 | Status: DISCONTINUED | OUTPATIENT
Start: 2021-09-27 | End: 2021-09-27

## 2021-09-27 RX ORDER — CEFTRIAXONE 500 MG/1
1000 INJECTION, POWDER, FOR SOLUTION INTRAMUSCULAR; INTRAVENOUS EVERY 24 HOURS
Refills: 0 | Status: DISCONTINUED | OUTPATIENT
Start: 2021-09-27 | End: 2021-09-28

## 2021-09-27 RX ORDER — POTASSIUM CHLORIDE 20 MEQ
40 PACKET (EA) ORAL ONCE
Refills: 0 | Status: COMPLETED | OUTPATIENT
Start: 2021-09-27 | End: 2021-09-27

## 2021-09-27 RX ORDER — ATORVASTATIN CALCIUM 80 MG/1
10 TABLET, FILM COATED ORAL AT BEDTIME
Refills: 0 | Status: DISCONTINUED | OUTPATIENT
Start: 2021-09-27 | End: 2021-10-03

## 2021-09-27 RX ORDER — ENOXAPARIN SODIUM 100 MG/ML
40 INJECTION SUBCUTANEOUS DAILY
Refills: 0 | Status: DISCONTINUED | OUTPATIENT
Start: 2021-09-27 | End: 2021-10-03

## 2021-09-27 RX ORDER — CEFTRIAXONE 500 MG/1
INJECTION, POWDER, FOR SOLUTION INTRAMUSCULAR; INTRAVENOUS
Refills: 0 | Status: DISCONTINUED | OUTPATIENT
Start: 2021-09-27 | End: 2021-09-27

## 2021-09-27 RX ORDER — SODIUM CHLORIDE 9 MG/ML
1000 INJECTION INTRAMUSCULAR; INTRAVENOUS; SUBCUTANEOUS
Refills: 0 | Status: DISCONTINUED | OUTPATIENT
Start: 2021-09-27 | End: 2021-09-27

## 2021-09-27 RX ORDER — LANOLIN ALCOHOL/MO/W.PET/CERES
3 CREAM (GRAM) TOPICAL AT BEDTIME
Refills: 0 | Status: DISCONTINUED | OUTPATIENT
Start: 2021-09-27 | End: 2021-10-03

## 2021-09-27 RX ORDER — SODIUM CHLORIDE 9 MG/ML
1000 INJECTION INTRAMUSCULAR; INTRAVENOUS; SUBCUTANEOUS
Refills: 0 | Status: DISCONTINUED | OUTPATIENT
Start: 2021-09-27 | End: 2021-09-30

## 2021-09-27 RX ORDER — CEFTRIAXONE 500 MG/1
1000 INJECTION, POWDER, FOR SOLUTION INTRAMUSCULAR; INTRAVENOUS ONCE
Refills: 0 | Status: COMPLETED | OUTPATIENT
Start: 2021-09-27 | End: 2021-09-27

## 2021-09-27 RX ORDER — LOSARTAN POTASSIUM 100 MG/1
25 TABLET, FILM COATED ORAL DAILY
Refills: 0 | Status: DISCONTINUED | OUTPATIENT
Start: 2021-09-27 | End: 2021-10-01

## 2021-09-27 RX ORDER — INFLUENZA VIRUS VACCINE 15; 15; 15; 15 UG/.5ML; UG/.5ML; UG/.5ML; UG/.5ML
0.5 SUSPENSION INTRAMUSCULAR ONCE
Refills: 0 | Status: DISCONTINUED | OUTPATIENT
Start: 2021-09-27 | End: 2021-10-03

## 2021-09-27 RX ADMIN — ATORVASTATIN CALCIUM 10 MILLIGRAM(S): 80 TABLET, FILM COATED ORAL at 22:00

## 2021-09-27 RX ADMIN — SODIUM CHLORIDE 75 MILLILITER(S): 9 INJECTION INTRAMUSCULAR; INTRAVENOUS; SUBCUTANEOUS at 22:45

## 2021-09-27 RX ADMIN — SODIUM CHLORIDE 75 MILLILITER(S): 9 INJECTION INTRAMUSCULAR; INTRAVENOUS; SUBCUTANEOUS at 17:56

## 2021-09-27 RX ADMIN — Medication 650 MILLIGRAM(S): at 16:05

## 2021-09-27 RX ADMIN — Medication 650 MILLIGRAM(S): at 20:26

## 2021-09-27 RX ADMIN — Medication 650 MILLIGRAM(S): at 20:56

## 2021-09-27 RX ADMIN — Medication 40 MILLIEQUIVALENT(S): at 08:48

## 2021-09-27 RX ADMIN — ENOXAPARIN SODIUM 40 MILLIGRAM(S): 100 INJECTION SUBCUTANEOUS at 08:48

## 2021-09-27 RX ADMIN — LOSARTAN POTASSIUM 25 MILLIGRAM(S): 100 TABLET, FILM COATED ORAL at 08:48

## 2021-09-27 RX ADMIN — SODIUM CHLORIDE 75 MILLILITER(S): 9 INJECTION INTRAMUSCULAR; INTRAVENOUS; SUBCUTANEOUS at 08:49

## 2021-09-27 RX ADMIN — CEFTRIAXONE 1000 MILLIGRAM(S): 500 INJECTION, POWDER, FOR SOLUTION INTRAMUSCULAR; INTRAVENOUS at 02:33

## 2021-09-27 RX ADMIN — SODIUM CHLORIDE 1000 MILLILITER(S): 9 INJECTION INTRAMUSCULAR; INTRAVENOUS; SUBCUTANEOUS at 02:33

## 2021-09-27 RX ADMIN — Medication 650 MILLIGRAM(S): at 05:20

## 2021-09-27 RX ADMIN — CEFTRIAXONE 1000 MILLIGRAM(S): 500 INJECTION, POWDER, FOR SOLUTION INTRAMUSCULAR; INTRAVENOUS at 17:57

## 2021-09-27 RX ADMIN — Medication 650 MILLIGRAM(S): at 15:58

## 2021-09-27 NOTE — H&P ADULT - NSHPSOCIALHISTORY_GEN_ALL_CORE
Patient is retired and lives alone at apartment, with regular visits from family, after   of COVID in . She is planning on moving in with one of her daughters.

## 2021-09-27 NOTE — H&P ADULT - HISTORY OF PRESENT ILLNESS
Patient is an 87 year old female, PMH of COVID, HTN, HLD rheumatoid arthritis on humira and predinsone, c/o weakness and fatigue. She endorses generalized fatigue since COVID infection in 2020, but also notes that she has residual weakness from the infx, that is more prominent in the extremities, necessitating a walker for ambulation and also causing her to have bm and urinary accidents because she cannot make it to the bathroom in time. Patient was at baseline until two days ago ambulating around her apartment, when she experienced sudden onset bilateral motor weakness of the lower extremities, and fell to the floor. No alleviating or aggravating factors. She did not hit or head, or notice any other antecedent or consequent trauma. Patient denies all other symptoms including ha, syncope, palpitations, sensory changes, pain or change of temperature in the lower extremities, tongue-biting or loss of control/changes in BM or urination. She lives alone and endorses having to "drag herself around all day" using her arms, until the weakness spontaneously resolved later that day. She has never symptoms like this before. When patient experienced similar episode the next day, she presented to ED for evaluation of weakness.    In the ED, patient was found to be slightly febrile at 99.6 w/ HR at 107, and elevated WBC at 23.02. Creatine kinase is 403; Urinalysis is positive for nitrites, LE, WBC, bacteria TNTC and large blood. CT abdomen showed Cystitis with bilateral ureteritis, possibly pyelonephritis. Patient was given NS bolus + dose of ceftriaxone and will be admitted for further workup. Patient is an 87 year old female, PMH of COVID, HTN, HLD rheumatoid arthritis on humira and predinsone, c/o weakness and fatigue. She endorses generalized fatigue since COVID infection in 2020, but also notes that she has residual weakness from the infx, that is more prominent in the extremities, necessitating a walker for ambulation and also causing her to have bm and urinary accidents because she cannot make it to the bathroom in time. Patient was at baseline until two days ago ambulating around her apartment, when she experienced sudden onset bilateral motor weakness of the lower extremities, and fell to the floor. No alleviating or aggravating factors. She did not hit or head, or notice any other antecedent or consequent trauma. Patient denies all other symptoms including ha, syncope, palpitations, sensory changes, pain or change of temperature in the lower extremities, tongue-biting or loss of control/changes in BM or urination. She lives alone and endorses having to "drag herself around all day" using her arms, until the weakness spontaneously resolved later that day. She has never symptoms like this before. When patient experienced similar episode the next day, patient's daughter encourage her to go to hospital and she was brought in by ambulance to ED for evaluation of weakness.    In the ED, patient was found to be slightly febrile at 99.6 w/ HR at 107, and elevated WBC at 23.02. Creatine kinase is 403; Urinalysis is positive for nitrites, LE, WBC, bacteria TNTC and large blood. CT abdomen showed Cystitis with bilateral ureteritis, possibly pyelonephritis. Patient was given NS bolus + dose of ceftriaxone and will be admitted for further workup. Patient is an 87 year old female, PMH of COVID, HTN, HLD rheumatoid arthritis on humira and predinsone, c/o weakness and fatigue. She endorses generalized fatigue since COVID infection in 2020, but also notes that she has residual weakness from the infx, that is more prominent in the extremities, necessitating a walker for ambulation and also causing her to have bm and urinary accidents because she cannot make it to the bathroom in time. Patient was at baseline until two days ago ambulating around her apartment, when she experienced sudden onset bilateral motor weakness of the lower extremities, and fell to the floor. No alleviating or aggravating factors. She did not hit or head, or notice any other antecedent or consequent trauma. Patient denies all other symptoms including ha, syncope, palpitations, sensory changes, pain or change of temperature in the lower extremities, tongue-biting or loss of control/changes in BM or urination. She lives alone and endorses having to "drag herself around all day" using her arms, until the weakness spontaneously resolved later that day. She has never symptoms like this before. When patient experienced similar episode the next day, patient's daughter encouraged her to go to hospital and she was brought in by ambulance to ED for evaluation of weakness.    In the ED, patient was found to be slightly febrile at 99.6 w/ HR at 107, and elevated WBC at 23.02. Creatine kinase is 403; Urinalysis is positive for nitrites, LE, WBC, bacteria TNTC and large blood. CT abdomen showed Cystitis with bilateral ureteritis, possibly pyelonephritis. Patient was given NS bolus + dose of ceftriaxone and will be admitted for further workup. Patient is an 87 year old female, PMH of COVID, HTN, HLD rheumatoid arthritis on humira and predinsone, c/o weakness and fatigue. She endorses generalized fatigue since COVID infection in early 2021, but also notes that she has residual weakness from the infx, that is more prominent in the extremities, necessitating a walker for ambulation and also causing her to have bm and urinary accidents because she cannot make it to the bathroom in time. Patient was at baseline until two days ago ambulating around her apartment, when she experienced sudden onset bilateral motor weakness of the lower extremities, and fell to the floor. No alleviating or aggravating factors. She did not hit her head, or notice any other antecedent or consequent trauma. Patient denies all other symptoms including ha, syncope, palpitations, sensory changes, pain or change of temperature in the lower extremities, tongue-biting or loss of control/changes in BM or urination. She lives alone and endorses having to "drag herself around all day" using her arms, until the weakness spontaneously resolved later that day. She has never symptoms like this before. When patient experienced similar episode the next day, patient's daughter encouraged her to go to hospital and she was brought in by ambulance to ED for evaluation of weakness.    In the ED, patient was found to be slightly febrile at 99.6 w/ HR at 107, and elevated WBC at 23.02. Creatine kinase is 403; Urinalysis is positive for nitrites, LE, WBC, bacteria TNTC and large blood. CT abdomen showed Cystitis with bilateral ureteritis, possibly pyelonephritis. Patient was given NS bolus + dose of ceftriaxone and will be admitted for further workup. Patient is an 87 year old female, PMH of COVID, HTN, HLD rheumatoid arthritis on humira and predinsone, c/o weakness and fatigue. During the interview, patient is slightly confused and AAOx2. She endorses generalized fatigue since COVID infection in early 2021, but also notes that she has residual weakness from the infx, that is more prominent in the extremities, necessitating a walker for ambulation and also causing her to have bm and urinary accidents because she cannot make it to the bathroom in time. Patient was at baseline until two days ago ambulating around her apartment, when she experienced episode of bilateral motor weakness of the lower extremities, and fell to the floor. No alleviating or aggravating factors. She did not hit her head, or notice any other antecedent or consequent trauma. Patient denies all other symptoms including ha, syncope, palpitations, sensory changes, pain or change of temperature in the lower extremities, tongue-biting or loss of control/changes in BM or urination. She lives alone and endorses having to "drag herself around all day" using her arms, until the weakness spontaneously resolved later that day. She has never symptoms like this before. When patient experienced similar episode the next day, patient's daughter encouraged her to go to hospital and she was brought in by ambulance to ED for evaluation of weakness.    In the ED, patient was found to be slightly febrile at 99.6 w/ HR at 107, and elevated WBC at 23.02. Creatine kinase is 403; Urinalysis is positive for nitrites, LE, WBC, bacteria TNTC and large blood. CT abdomen showed Cystitis with bilateral ureteritis, possibly pyelonephritis. Patient was given NS 2 L bolus + dose of ceftriaxone and tylenol, and will be admitted for further workup.

## 2021-09-27 NOTE — PROGRESS NOTE ADULT - ASSESSMENT
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      # Acute metabolic encephalopathy ,Leukocytosis   -Under investigation. Probably during due to infection  -Blood and urine culture pending   -Iv ceftriaxone  -ID referal  -We will closely monitor her    # Hyperglycemia   -Pre diabetic, Hba1c ordered-5.8  -We will continue to monitor the glucose closely     # Elevated CK   - , will likely improve with IVF    - Not high enough to be rhabdomyolysis or statin induced myopathy  - Will continue to monitor her CK     # Systolic ejection murmur   - ECHO results  - normal,  check above     #HTN   -Stable- on losartan     #HLD  -On atorvostatin    # RA   -Stable on  Humira and Prednisone     #DVT ppx: Lovenox 40 mg subcutaneous daily     Code status: Will  contact her family and complete MOLST.     Case was discussed with Dr. Ely

## 2021-09-27 NOTE — H&P ADULT - NSICDXPASTMEDICALHX_GEN_ALL_CORE_FT
PAST MEDICAL HISTORY:  Rheumatoid arthritis       Arthritis     COVID-19     High cholesterol     HTN (hypertension)

## 2021-09-27 NOTE — H&P ADULT - NSHPPHYSICALEXAM_GEN_ALL_CORE
Constitutional: Pt lying in bed, awake and alert, NAD  HEENT: EOMI, normal hearing, moist mucous membranes  Neck: Soft and supple, no JVD  Respiratory: CTABL, No wheezing, rales or rhonchi  Cardiovascular: S1S2+, RRR, slightly tachycardic, murmur was appreciated  Gastrointestinal: BS+, soft, NT/ND, no guarding, no rebound. No costovertebral tenderness.  Extremities: No peripheral edema  Vascular: 2+ peripheral pulses  Neurological: AAOx3, no focal deficits  Musculoskeletal: 4/5 strength b/l upper and lower extremities, some tremors when squeezing fingers.  Skin: No rashes Constitutional: Pt lying in bed, awake and alert, NAD, slightly confused  HEENT: EOMI, normal hearing, moist mucous membranes  Neck: Soft and supple, no JVD  Respiratory: CTABL, No wheezing, rales or rhonchi  Cardiovascular: S1S2+, RRR, slightly tachycardic, murmur was appreciated  Gastrointestinal: BS+, soft, NT/ND, no guarding, no rebound. No costovertebral tenderness.  Extremities: No peripheral edema  Vascular: 2+ peripheral pulses  Neurological: AAOx2, no focal deficits  Musculoskeletal: 4/5 strength b/l upper and lower extremities, some tremors when squeezing fingers.  Skin: No rashes

## 2021-09-27 NOTE — H&P ADULT - ASSESSMENT
Patient is an 87 year old female, PMH of COVID, HTN, HLD rheumatoid arthritis on humira and predinsone, c/o weakness and fatigue. She is admitted for:    #Acute onset b/l weakness possibly 2/2 covid neuropathy /myopathy vs TIA vs immune-mediated vasculitis (less likely)  - Patient endorses fatigue and residual extremity weakness after COVID infection in 2020.  - Sudden onset episode of b/l LE weakness x2, likely resolved  - WBC at 23.02, CK elevated at 403, urine with WBC's and RBC's.  - NIH stroke scale 0, however due to presenting symptoms and hypercoagulability associated w/ COVID, should r/o stroke/TIA  - Consider MR/MRA neck/head/ brain, and ALE  - Consider neuro and cardio consults  - Neurochecks q 4 hrs  - Fall and aspiration precautions  - F/u HbA1c and lipid panel  - Started on ASA, c/w home statin  - Dash Diet  - PT consulted    #Cystitis + b/l ureteritis, possible pyelonephritis  - Patient with frequent bladder accidents, - for costovertebral tenderness  - Positive SIRS criteria (2/4) w/ WBC at 23 and HR at 107.  - Source likely to be urinary tract  - Cystitis + b/l ureteritis, possible pyelonephritis (less likely)  - C/w fluids  - Rocephin started in ED; will continue and add azithromycin  - F/u blood and urine cultures  - Continue to monitor labs  - ID consult    #HDL  - Monitor labs  - Continue with statin    #HTN  - Monitor pressures  - DASH diet  - Continue with lisinopril    #Rheumatoid arthritis  - C/w humira injections  - C/w PRN prednisone for acute flares    #CODE STATUS  - DNR/DNI    #Dispo  - Home with home care, following w/u    Case discussed with Dr. Mendoza Patient is an 87 year old female, PMH of COVID, HTN, HLD rheumatoid arthritis on humira and predinsone, c/o weakness and fatigue. She is admitted for:    #Acute onset b/l weakness possibly 2/2 covid neuropathy /myopathy vs TIA vs immune-mediated vasculitis (less likely)  - Patient endorses fatigue and residual extremity weakness after COVID infection in 2020.  - Sudden onset episode of b/l LE weakness x2, likely resolved  - WBC at 23.02, CK elevated at 403, urine with WBC's and RBC's.  - Rhabdo and statin-induced myositis less likely d/t no reported muscle pain  - NIH stroke scale 0, however due to presenting symptoms and hypercoagulability associated w/ COVID, should r/o stroke/TIA  - Consider MR/MRA neck/head/ brain, and ALE  - Consider neuro and cardio consults  - Neurochecks q 4 hrs  - Fall and aspiration precautions  - F/u HbA1c and lipid panel  - Started on ASA, c/w home statin  - Dash Diet  - PT consulted    #Cystitis + b/l ureteritis, possible pyelonephritis  - Patient with frequent bladder accidents, - for costovertebral tenderness  - Positive SIRS criteria (2/4) w/ WBC at 23 and HR at 107.  - Source likely to be urinary tract  - CT abdomen positive for cystitis + b/l ureteritis, possible pyelonephritis (less likely)  - C/w fluids  - Rocephin started in ED; will continue and add azithromycin, as infection is currently uncomplicated and pt has no previous hospitalizations for UTI or recent recurrent UTI hx.  - F/u blood and urine cultures  - Continue to monitor labs  - ID consult    #HDL  - Monitor labs  - Continue with statin    #HTN  - Monitor pressures  - DASH diet  - Continue with lisinopril    #Rheumatoid arthritis  - C/w humira injections  - C/w PRN prednisone for acute flares    #CODE STATUS  - DNR/DNI    #Dispo  - Home with home care, following w/u    Case discussed with Dr. Mendoza Patient is an 87 year old female, PMH of COVID, HTN, HLD rheumatoid arthritis on humira and predinsone, c/o weakness and fatigue. She is admitted for:    #Acute Metabolic Encephalopathy 2/2 acute sepsis  #Cystitis + b/l ureteritis, possible pyelonephritis  - Patient is slightly confused, w/AAOx2 a/w cc of fatigue and weakness  - Episode of b/l LE weakness x2, likely resolved  - Patient with frequent bladder accidents, - for costovertebral tenderness  - Meets sepsis criteria (w/ 2/4 SIRS): WBC at 23 and HR at 107. Source likely to be urinary tract  - CK elevated at 403, Urine + for LE, nitrites, WBC's, bacteria TNTC  - CT abdomen positive for cystitis + b/l ureteritis, possible pyelonephritis (less likely)  - Rhabdo and statin-induced myositis less likely d/t no reported muscle pain  - NIH stroke scale at 0, with stroke unlikely at this time. If symptoms worsen, could consider MRI head a/w neuro consult  - NS bolus given in ED, will continue on NS at 75 mL/min  - Rocephin started in ED; will continue as infection is currently uncomplicated and pt has no previous hospitalizations for UTI or recent recurrent UTI hx.  - F/u blood and urine cultures  - Continue to monitor labs  - Fall precautions  - PT consulted    #HDL  - Monitor labs  - Continue with statin    #HTN  - Monitor pressures  - DASH diet  - Continue with lisinopril    #Rheumatoid arthritis  - C/w humira injections  - C/w PRN prednisone for acute flares    #CODE STATUS  - Full code for now, patient amenable to further discussion together w/two daughters in the AM.    #Dispo  - Home with home care, following w/u    Case discussed with Dr. Mendoza Patient is an 87 year old female, PMH of COVID, HTN, HLD rheumatoid arthritis on humira and predinsone, c/o weakness and fatigue. She is admitted for:    #Acute Metabolic Encephalopathy 2/2 acute sepsis  #Cystitis + b/l ureteritis, possible pyelonephritis  - Patient is slightly confused, w/AAOx2 a/w cc of fatigue and weakness  - Episode of b/l LE weakness x2, likely resolved  - Patient with frequent bladder accidents, - for costovertebral tenderness, but murmur found on exam  - Meets sepsis criteria (w/ 2/4 SIRS): WBC at 23 and HR at 107. Source likely to be urinary tract  - CK elevated at 403, Urine + for LE, nitrites, WBC's, bacteria TNTC  - CT abdomen positive for cystitis + b/l ureteritis, possible pyelonephritis (less likely)  - Rhabdo and statin-induced myositis less likely d/t no reported muscle pain  - NIH stroke scale at 0, with stroke unlikely at this time. If symptoms worsen, could consider MRI head a/w neuro consult  - NS bolus given in ED, will continue on NS at 75 mL/min  - Rocephin started in ED; will continue as infection is currently uncomplicated and pt has no previous hospitalizations for UTI or recent recurrent UTI hx.  - F/u blood and urine cultures  - F/u TTE  - Continue to monitor labs  - Fall precautions  - PT consulted    #HDL  - Monitor labs  - Continue with statin    #HTN  - Monitor pressures  - DASH diet  - Continue with lisinopril    #Rheumatoid arthritis  - C/w humira injections  - C/w PRN prednisone for acute flares    #CODE STATUS  - Full code for now, patient amenable to further discussion together w/two daughters in the AM.    #Dispo  - Home with home care, following w/u    Case discussed with Dr. Mendoza

## 2021-09-27 NOTE — H&P ADULT - NSHPLABSRESULTS_GEN_ALL_CORE
LABS: All Labs Reviewed:                        13.4    )-----------( 189      ( 26 Sep 2021 21:24 )             41.2         134<L>  |  100  |  20  ----------------------------<  132<H>  3.7   |  28  |  1.06    Ca    9.2      26 Sep 2021 21:24  Mg     1.8         TPro  7.9  /  Alb  3.4  /  TBili  0.7  /  DBili  x   /  AST  24  /  ALT  23  /  AlkPhos  75        CARDIAC MARKERS ( 26 Sep 2021 22:46 )  0.028 ng/mL / x     / x     / x     / x      CARDIAC MARKERS ( 26 Sep 2021 21:24 )  0.033 ng/mL / x     / 403 U/L / x     / x    Urinalysis Basic - ( 26 Sep 2021 23:26 )    Color: Yellow / Appearance: very cloudy / S.005 / pH: x  Gluc: x / Ketone: Negative  / Bili: Negative / Urobili: Negative mg/dL   Blood: x / Protein: 100 mg/dL / Nitrite: Positive   Leuk Esterase: Moderate / RBC: 25-50 /HPF / WBC >50   Sq Epi: x / Non Sq Epi: Few / Bacteria: TNTC    IMAGING    CXR: Wet read shows no acute changes    < from: CT Abdomen and Pelvis w/ IV Cont (21 @ 23:01) >  Cystitis with bilateral ureteritis. Correlate clinically for pyelonephritis  No convincing focal bowel wall thickening.  Diffuse colonic diverticulosis.  < end of copied text >

## 2021-09-27 NOTE — H&P ADULT - ATTENDING COMMENTS
Resident history as documented below reviewed. Please see resident H&P for full details regarding the service. Patient seen and examined at the bedside. 88 y/o F presents for generalized weakness.     ER course: Temp 99.6F, , SpO2 93% on room air. Labs: WBC 23.02, Na 134, Glucose 132, . UA: very cloudy, positive nitrite, moderate leukocyte esterase, large blood, WBC >50, RBC 25-50, Bacteria TNTC. COVID negative. EKG: sinus tachycardia  bpm, QTC prolonged 491 ms, RBBB (personally reviewed).     Imaging:   - CT abd/pelvis: Cystitis with bilateral ureteritis. Correlate clinically for pyelonephritis. No convincing focal bowel wall thickening. Diffuse colonic diverticulosis.  - CXR: patchy bilateral infiltrates, no effusion, no pneumothorax, no focal consolidation (personally reviewed).     Pt was given Ceftriaxone, 2L of NS, Tylenol.     ICU Vital Signs Last 24 Hrs  T(C): 39.2 (27 Sep 2021 04:50), Max: 39.2 (27 Sep 2021 04:50)  T(F): 102.5 (27 Sep 2021 04:50), Max: 102.5 (27 Sep 2021 04:50)  HR: 108 (27 Sep 2021 04:50) (107 - 108)  BP: 152/61 (27 Sep 2021 04:50) (128/60 - 152/61)  RR: 18 (27 Sep 2021 04:50) (18 - 18)  SpO2: 95% (27 Sep 2021 04:50) (93% - 95%)    General: Awake and alert, cooperative with exam. No acute distress.   Skin: Warm, dry, and pink.   Eyes: Pupils equal and reactive to light. Extraocular eye movements intact. No conjunctival injection, discharge, or scleral icterus.   HEENT: Atraumatic, normocephalic. Moist mucus membranes.   Cardiology: Normal S1, S2. Systolic ejection murmur. Tachycardia.   Respiratory: Lungs clear to ascultation bilaterally. Good air exchange. No wheezes, rales, or rhonchi. Normal chest expansion.   Gastrointestinal: Positive bowel sounds. Soft, non-tender, non-distended. No guarding, rigidity, or rebound tenderness. No hepatosplenomegaly. No CVA tenderness.   Musculoskeletal: 5/5 motor strength in all extremities. Normal range of motion.   Extremities: No peripheral edema bilaterally. Dorsalis pedis pulses 2+ bilaterally.   Neurological: A+Ox2 (person, place). Cranial nerves 2-12 intact. Normal speech. No facial droop. No focal neurological deficits.  Psychiatric: Normal affect. Normal mood.     88 y/o F presents for weakness    1. Acute metabolic encephalopathy secondary to sepsis from cystitis with bilateral ureteritis  - Temp 99.6F, , WBC 23.02   - UA: positive nitrite, moderate leukocyte esterase, WBC >50, Bacteria TNTC  - CT abd/pelvis: Cystitis with bilateral ureteritis  - c/w Ceftriaxone   - f/u UCx, BCx and adjust abx based on sensitivities   - IVF with NS at 75 ml/hr     2. Leukocytosis   - WBC 23.02, trend   - Tylenol for fevers PRN     3. Hyperglycemia   - Glucose 132, monitor serum glucose closely     4. Elevated CK   - , will likely improve with IVF   - Recheck in AM   - Not high enough to be rhabdomyolysis or statin induced myopathy    5. Systolic ejection murmur   - ECHO ordered   - Strict I+Os, daily weights, monitor for fluid overload     6. History of COVID, HTN, HLD, RA (on Humira and Prednisone)   - c/w home medications     DVT ppx: Lovenox 40 mg subcutaneous daily   Code status: Pt is slightly confused at baseline; she expressed wishes to be DNR and DNI to the resident but to me only DNR. She would like to discuss this further with the family. Please try and contact her family and complete MOLST. 87 year old female, PMH of COVID, HTN, HLD rheumatoid arthritis on humira and predinsone, c/o weakness and fatigue. She is admitted for:    #Acute Metabolic Encephalopathy 2/2 acute sepsis  #Cystitis + b/l ureteritis, possible pyelonephritis  - Patient is slightly confused, w/AAOx2 a/w cc of fatigue and weakness  - Episode of b/l LE weakness x2, likely resolved  - Patient with frequent bladder accidents, - for costovertebral tenderness, but murmur found on exam  - Meets sepsis criteria (w/ 2/4 SIRS): WBC at 23 and HR at 107. Source likely to be urinary tract  - CK elevated at 403, Urine + for LE, nitrites, WBC's, bacteria TNTC  - CT abdomen positive for cystitis + b/l ureteritis, possible pyelonephritis (less likely)  - Rhabdo and statin-induced myositis less likely d/t no reported muscle pain  - NIH stroke scale at 0, with stroke unlikely at this time. If symptoms worsen, could consider MRI head a/w neuro consult  - NS bolus given in ED, will continue on NS at 75 mL/min  - Rocephin started in ED; will continue as infection is currently uncomplicated and pt has no previous hospitalizations for UTI or recent recurrent UTI hx.  - F/u blood and urine cultures  - F/u TTE  - Continue to monitor labs

## 2021-09-28 LAB
ALBUMIN SERPL ELPH-MCNC: 2.5 G/DL — LOW (ref 3.3–5)
ALP SERPL-CCNC: 66 U/L — SIGNIFICANT CHANGE UP (ref 40–120)
ALT FLD-CCNC: 23 U/L — SIGNIFICANT CHANGE UP (ref 12–78)
ANION GAP SERPL CALC-SCNC: 3 MMOL/L — LOW (ref 5–17)
AST SERPL-CCNC: 21 U/L — SIGNIFICANT CHANGE UP (ref 15–37)
BASOPHILS # BLD AUTO: 0.09 K/UL — SIGNIFICANT CHANGE UP (ref 0–0.2)
BASOPHILS NFR BLD AUTO: 0.4 % — SIGNIFICANT CHANGE UP (ref 0–2)
BILIRUB SERPL-MCNC: 0.2 MG/DL — SIGNIFICANT CHANGE UP (ref 0.2–1.2)
BUN SERPL-MCNC: 14 MG/DL — SIGNIFICANT CHANGE UP (ref 7–23)
CALCIUM SERPL-MCNC: 7.7 MG/DL — LOW (ref 8.5–10.1)
CHLORIDE SERPL-SCNC: 107 MMOL/L — SIGNIFICANT CHANGE UP (ref 96–108)
CO2 SERPL-SCNC: 27 MMOL/L — SIGNIFICANT CHANGE UP (ref 22–31)
CREAT SERPL-MCNC: 0.95 MG/DL — SIGNIFICANT CHANGE UP (ref 0.5–1.3)
EOSINOPHIL # BLD AUTO: 0.26 K/UL — SIGNIFICANT CHANGE UP (ref 0–0.5)
EOSINOPHIL NFR BLD AUTO: 1.1 % — SIGNIFICANT CHANGE UP (ref 0–6)
GLUCOSE SERPL-MCNC: 120 MG/DL — HIGH (ref 70–99)
HCT VFR BLD CALC: 35 % — SIGNIFICANT CHANGE UP (ref 34.5–45)
HGB BLD-MCNC: 11.5 G/DL — SIGNIFICANT CHANGE UP (ref 11.5–15.5)
IMM GRANULOCYTES NFR BLD AUTO: 1.5 % — SIGNIFICANT CHANGE UP (ref 0–1.5)
LYMPHOCYTES # BLD AUTO: 11.2 % — LOW (ref 13–44)
LYMPHOCYTES # BLD AUTO: 2.7 K/UL — SIGNIFICANT CHANGE UP (ref 1–3.3)
MCHC RBC-ENTMCNC: 29.4 PG — SIGNIFICANT CHANGE UP (ref 27–34)
MCHC RBC-ENTMCNC: 32.9 GM/DL — SIGNIFICANT CHANGE UP (ref 32–36)
MCV RBC AUTO: 89.5 FL — SIGNIFICANT CHANGE UP (ref 80–100)
MONOCYTES # BLD AUTO: 2.12 K/UL — HIGH (ref 0–0.9)
MONOCYTES NFR BLD AUTO: 8.8 % — SIGNIFICANT CHANGE UP (ref 2–14)
NEUTROPHILS # BLD AUTO: 18.66 K/UL — HIGH (ref 1.8–7.4)
NEUTROPHILS NFR BLD AUTO: 77 % — SIGNIFICANT CHANGE UP (ref 43–77)
PLATELET # BLD AUTO: 178 K/UL — SIGNIFICANT CHANGE UP (ref 150–400)
POTASSIUM SERPL-MCNC: 3.5 MMOL/L — SIGNIFICANT CHANGE UP (ref 3.5–5.3)
POTASSIUM SERPL-SCNC: 3.5 MMOL/L — SIGNIFICANT CHANGE UP (ref 3.5–5.3)
PROT SERPL-MCNC: 6.5 GM/DL — SIGNIFICANT CHANGE UP (ref 6–8.3)
RBC # BLD: 3.91 M/UL — SIGNIFICANT CHANGE UP (ref 3.8–5.2)
RBC # FLD: 14.5 % — SIGNIFICANT CHANGE UP (ref 10.3–14.5)
SODIUM SERPL-SCNC: 137 MMOL/L — SIGNIFICANT CHANGE UP (ref 135–145)
WBC # BLD: 24.19 K/UL — HIGH (ref 3.8–10.5)
WBC # FLD AUTO: 24.19 K/UL — HIGH (ref 3.8–10.5)

## 2021-09-28 PROCEDURE — 99233 SBSQ HOSP IP/OBS HIGH 50: CPT | Mod: GC

## 2021-09-28 RX ORDER — CEFTRIAXONE 500 MG/1
2000 INJECTION, POWDER, FOR SOLUTION INTRAMUSCULAR; INTRAVENOUS EVERY 24 HOURS
Refills: 0 | Status: DISCONTINUED | OUTPATIENT
Start: 2021-09-28 | End: 2021-09-30

## 2021-09-28 RX ADMIN — SODIUM CHLORIDE 75 MILLILITER(S): 9 INJECTION INTRAMUSCULAR; INTRAVENOUS; SUBCUTANEOUS at 21:15

## 2021-09-28 RX ADMIN — ENOXAPARIN SODIUM 40 MILLIGRAM(S): 100 INJECTION SUBCUTANEOUS at 12:09

## 2021-09-28 RX ADMIN — SODIUM CHLORIDE 75 MILLILITER(S): 9 INJECTION INTRAMUSCULAR; INTRAVENOUS; SUBCUTANEOUS at 10:04

## 2021-09-28 RX ADMIN — Medication 650 MILLIGRAM(S): at 14:27

## 2021-09-28 RX ADMIN — CEFTRIAXONE 2000 MILLIGRAM(S): 500 INJECTION, POWDER, FOR SOLUTION INTRAMUSCULAR; INTRAVENOUS at 12:09

## 2021-09-28 RX ADMIN — LOSARTAN POTASSIUM 25 MILLIGRAM(S): 100 TABLET, FILM COATED ORAL at 10:04

## 2021-09-28 RX ADMIN — Medication 650 MILLIGRAM(S): at 12:53

## 2021-09-28 RX ADMIN — ATORVASTATIN CALCIUM 10 MILLIGRAM(S): 80 TABLET, FILM COATED ORAL at 21:15

## 2021-09-28 RX ADMIN — Medication 650 MILLIGRAM(S): at 23:32

## 2021-09-28 NOTE — PHYSICAL THERAPY INITIAL EVALUATION ADULT - ADDITIONAL COMMENTS
Lives in apt. No stairs. Has shower chair and 3 wheeled rollater. In 2 weeks will move into daughters apt where dtr installed stair lift for 5 steps to enter main level.

## 2021-09-28 NOTE — PROGRESS NOTE ADULT - NSPROGADDITIONALINFOA_GEN_ALL_CORE
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    # Bacteriamia  -Possibly due to cystitis  -Blood culture- Positive for gram negative rods  - Continue Iv ceftriaxone 2gm iv every 24 hrs  -ID referral appreciated   -We will closely monitor

## 2021-09-28 NOTE — PHYSICAL THERAPY INITIAL EVALUATION ADULT - GENERAL OBSERVATIONS, REHAB EVAL
Pre session: supine in bed with bed alarm on. Call bell and phone in reach. IV intact, Post session:  Seated in chair with chair alarm on. Call bell and phone in reach. C/o B knee pain due to chronic arthritis. nsg aware. Tolerated well and would benefit from further skilled PT for functional mobility training.

## 2021-09-28 NOTE — PROGRESS NOTE ADULT - ASSESSMENT
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      # Bacteriamia  -Possibly due to cystitis  -Blood culture- Positive for gram negative rods  - Continue Iv ceftriaxone 2gm iv every 24 hrs  -ID referral appreciated   -We will closely monitor her    # Hyperglycemia   -Pre diabetic, Hba1c ordered-5.8  -We will continue to monitor the glucose closely     # Elevated CK   - , will likely improve with IVF    - Not high enough to be rhabdomyolysis or statin induced myopathy  - Will continue to monitor her CK     # Systolic ejection murmur   - ECHO results  - normal,  check above     #HTN   -Stable- on losartan     #HLD  -On Atorvastatin    # RA   -Stable on  Humira and Prednisone     #DVT ppx:  - Lovenox 40 mg subcutaneous daily     Code status: Contacted the daughter today , she will discuss with her mother about her wishes and will let us know  tomorrow.    Case was discussed with Dr. Ely

## 2021-09-28 NOTE — PHYSICAL THERAPY INITIAL EVALUATION ADULT - DIAGNOSIS, PT EVAL
Acute Metabolic Encephalopathy 2/2 acute sepsis: Urinalysis is positive for nitrites, LE, WBC, bacteria TNTC and large blood. CT abdomen showed Cystitis with bilateral ureteritis, possibly pyelonephritis.

## 2021-09-28 NOTE — CONSULT NOTE ADULT - SUBJECTIVE AND OBJECTIVE BOX
Patient is a 87y old  Female who presents with a chief complaint of Weakness (27 Sep 2021 19:28)    HPI:  Patient is an 87 year old female, PMH of COVID, HTN, HLD rheumatoid arthritis on humira and prednisone, admitted on  for evaluation of weakness and fatigue; she has not felt the same since having covid infection, from which her  . She was having urinary urgency and frequency with mild fever and rapid heart rate upon admission.           PMH: as above  PSH: as above  Meds: per reconciliation sheet, noted below  MEDICATIONS  (STANDING):  atorvastatin 10 milliGRAM(s) Oral at bedtime  cefTRIAXone Injectable. 2000 milliGRAM(s) IV Push every 24 hours  enoxaparin Injectable 40 milliGRAM(s) SubCutaneous daily  influenza   Vaccine 0.5 milliLiter(s) IntraMuscular once  losartan 25 milliGRAM(s) Oral daily  sodium chloride 0.9%. 1000 milliLiter(s) (75 mL/Hr) IV Continuous <Continuous>    MEDICATIONS  (PRN):  acetaminophen   Tablet .. 650 milliGRAM(s) Oral every 6 hours PRN Mild Pain (1 - 3)  aluminum hydroxide/magnesium hydroxide/simethicone Suspension 30 milliLiter(s) Oral every 4 hours PRN Dyspepsia  melatonin 3 milliGRAM(s) Oral at bedtime PRN Insomnia  ondansetron Injectable 4 milliGRAM(s) IV Push every 6 hours PRN Nausea and/or Vomiting  predniSONE   Tablet 5 milliGRAM(s) Oral daily PRN for ra flares    Allergies    sulfa drugs (Unknown)    Intolerances      Social: no smoking, no alcohol, no illegal drugs; no recent travel, no exposure to TB  FAMILY HISTORY:     no history of premature cardiovascular disease in first degree relatives  ROS: the patient denies fever, no chills, no HA, no dizziness, no sore throat, no blurry vision, no CP, no palpitations, no SOB, no cough, no abdominal pain, no diarrhea, no N/V, no dysuria, no leg pain, no claudication, no rash, no joint aches, no rectal pain or bleeding, no night sweats  All other systems reviewed and are negative    Vital Signs Last 24 Hrs  T(C): 37 (28 Sep 2021 16:09), Max: 37.3 (28 Sep 2021 05:50)  T(F): 98.6 (28 Sep 2021 16:09), Max: 99.2 (28 Sep 2021 08:17)  HR: 82 (28 Sep 2021 16:09) (82 - 100)  BP: 148/76 (28 Sep 2021 16:09) (148/76 - 159/79)  BP(mean): --  RR: 18 (28 Sep 2021 16:09) (18 - 18)  SpO2: 99% (28 Sep 2021 16:09) (94% - 99%)  Daily     Daily Weight in k.9 (28 Sep 2021 05:50)    PE:    Constitutional: frail looking  HEENT: NC/AT, EOMI, PERRLA, conjunctivae clear; ears and nose atraumatic; pharynx clear  Neck: supple; thyroid not palpable  Back: no tenderness  Respiratory: respiratory effort normal; clear to auscultation  Cardiovascular: S1S2 regular, no murmurs  Abdomen: soft, not tender, not distended, positive BS; no liver or spleen organomegaly  Genitourinary: no suprapubic tenderness  Musculoskeletal: no muscle tenderness, no joint swelling or tenderness  Neurological/ Psychiatric: AxOx3, judgement and insight normal;  moving all extremities  Skin: no rashes; no palpable lesions    Labs: all available labs reviewed                        11.5   24.19 )-----------( 178      ( 28 Sep 2021 06:40 )             35.0         137  |  107  |  14  ----------------------------<  120<H>  3.5   |  27  |  0.95    Ca    7.7<L>      28 Sep 2021 15:37  Mg     1.8         TPro  6.5  /  Alb  2.5<L>  /  TBili  0.2  /  DBili  x   /  AST  21  /  ALT  23  /  AlkPhos  66       LIVER FUNCTIONS - ( 28 Sep 2021 15:37 )  Alb: 2.5 g/dL / Pro: 6.5 gm/dL / ALK PHOS: 66 U/L / ALT: 23 U/L / AST: 21 U/L / GGT: x           Urinalysis Basic - ( 26 Sep 2021 23:26 )    Color: Yellow / Appearance: very cloudy / S.005 / pH: x  Gluc: x / Ketone: Negative  / Bili: Negative / Urobili: Negative mg/dL   Blood: x / Protein: 100 mg/dL / Nitrite: Positive   Leuk Esterase: Moderate / RBC: 25-50 /HPF / WBC >50   Sq Epi: x / Non Sq Epi: Few / Bacteria: TNTC    Culture - Blood (21 @ 02:01)    Gram Stain:   Growth in aerobic and anaerobic bottles: Gram Negative Rods    -  Escherichia coli: Detec    Specimen Source: .Blood None    Organism: Blood Culture PCR    Culture Results:   Growth in aerobic and anaerobic bottles: Escherichia coli  ***Blood Panel PCR results on this specimen are available  approximately 3 hours after the Gram stain result.***  Gram stain, PCR, and/or culture results may not always  correspond due to difference in methodologies.  ************************************************************  This PCR assay was performed by multiplex PCR. This  Assay tests for 66 bacterial and resistance gene targets.  Please refer to the [facility] test directory  at [email or web address] for details.    Organism Identification: Blood Culture PCR    Method Type: PCR          Radiology: all available radiological tests reviewed  Culture - Blood (21 @ 01:54)    Gram Stain:   Growth in aerobic and anaerobic bottles: Gram Negative Rods    Specimen Source: .Blood None    Culture Results:   Growth in aerobic and anaerobic bottles: Escherichia coli  See previous culture [id number]              < from: CT Abdomen and Pelvis w/ IV Cont (21 @ 23:01) >    EXAM:  CT ABDOMEN AND PELVIS IC                            PROCEDURE DATE:  2021          INTERPRETATION:  CLINICAL INFORMATION: Diarrhea and weakness    COMPARISON: None.    CONTRAST/COMPLICATIONS:  IV Contrast: Omnipaque 350  90 cc administered   10 cc discarded  Oral Contrast: NONE  Complications: None reported at time of study completion    PROCEDURE:  CT of the Abdomen and Pelvis was performed.  Sagittal and coronal reformats were performed.    FINDINGS:  LOWER CHEST: Heart is enlarged. Nonspecific interstitial and groundglass opacities at the lung bases.    LIVER: Lobulated cystic lesion measuring 1.8 cm in the inferior right lobe.  BILE DUCTS: Normal caliber.  GALLBLADDER: Within normal limits.  SPLEEN: Within normal limits.  PANCREAS: Atrophic.  ADRENALS: Within normal limits.  KIDNEYS/URETERS: No hydronephrosis. Small right renal cyst. Urothelial enhancement of the bilateral renal pelves and ureters suggesting ascending urinary tract infection.    BLADDER: Incompletely distended with wall thickening suggesting cystitis. REPRODUCTIVE ORGANS: No pelvic masses.    BOWEL: Small hiatal hernia. No bowel obstruction. Appendix is unremarkable. The colon is underdistended without significant fecal load. Diffuse colonic diverticulosis.  PERITONEUM: No ascites.  VESSELS: Aorta is not dilated. Moderate atherosclerotic vascular calcification.  RETROPERITONEUM/LYMPH NODES: No lymphadenopathy.  ABDOMINAL WALL: Within normal limits.  BONES: Within normal limits.    IMPRESSION:    Cystitis with bilateral ureteritis. Correlate clinically for pyelonephritis  No convincing focal bowel wall thickening.  Diffuse colonic diverticulosis.    < end of copied text >                Advanced directives addressed: full resuscitation

## 2021-09-28 NOTE — CONSULT NOTE ADULT - HEIGHT IN INCHES
Add 71964 Cpt? (Important Note: In 2017 The Use Of 13798 Is Being Tracked By Cms To Determine Future Global Period Reimbursement For Global Periods): yes
Body Location Override (Optional - Billing Will Still Be Based On Selected Body Map Location If Applicable): left inferior postauricular skin superior and inferior
Detail Level: Detailed
5

## 2021-09-28 NOTE — PHYSICAL THERAPY INITIAL EVALUATION ADULT - FUNCTIONAL LIMITATIONS, PT EVAL
self-care/home management [employer] Pregnancy And Lactation Text: This medication is Pregnancy Category B and is considered safe. There is some data to suggest avoiding during the third trimester. It is unknown if this medication is excreted in breast milk.

## 2021-09-28 NOTE — CONSULT NOTE ADULT - ASSESSMENT
Patient is an 87 year old female, PMH of COVID, HTN, HLD rheumatoid arthritis on humira and prednisone, admitted on  for evaluation of weakness and fatigue; she has not felt the same since having covid infection, from which her  . She was having urinary urgency and frequency with mild fever and rapid heart rate upon admission.   1. Patient admitted with sepsis secondary to E coli most likely of urinary origin given cystitis seen on imaging, also noted with leukocytosis most likely reactive to infection  - follow up cultures to sensitivity  - iv hydration and supportive care   - serial cbc and monitor temperature   - reviewed prior medical records to evaluate for resistant or atypical pathogens   - will continue the ceftriaxone ordered at 2 grams daily  - will repeat blood cultures in am  2. other issues: pe medicine

## 2021-09-28 NOTE — PHYSICAL THERAPY INITIAL EVALUATION ADULT - PERTINENT HX OF CURRENT PROBLEM, REHAB EVAL
generalized fatigue since COVID infection in early 2021, but also notes that she has residual weakness from the infx, that is more prominent in the extremities, necessitating a walker for ambulation and also causing her to have bm and urinary accidents because she cannot make it to the bathroom in time. Patient was at baseline until two days ago ambulating around her apartment, when she experienced episode of bilateral motor weakness of the lower extremities, and fell to the floor.

## 2021-09-29 LAB
-  AMIKACIN: SIGNIFICANT CHANGE UP
-  AMPICILLIN/SULBACTAM: SIGNIFICANT CHANGE UP
-  AMPICILLIN: SIGNIFICANT CHANGE UP
-  AZTREONAM: SIGNIFICANT CHANGE UP
-  CEFAZOLIN: SIGNIFICANT CHANGE UP
-  CEFEPIME: SIGNIFICANT CHANGE UP
-  CEFOXITIN: SIGNIFICANT CHANGE UP
-  CEFTRIAXONE: SIGNIFICANT CHANGE UP
-  CIPROFLOXACIN: SIGNIFICANT CHANGE UP
-  ERTAPENEM: SIGNIFICANT CHANGE UP
-  GENTAMICIN: SIGNIFICANT CHANGE UP
-  IMIPENEM: SIGNIFICANT CHANGE UP
-  LEVOFLOXACIN: SIGNIFICANT CHANGE UP
-  MEROPENEM: SIGNIFICANT CHANGE UP
-  PIPERACILLIN/TAZOBACTAM: SIGNIFICANT CHANGE UP
-  TOBRAMYCIN: SIGNIFICANT CHANGE UP
-  TRIMETHOPRIM/SULFAMETHOXAZOLE: SIGNIFICANT CHANGE UP
ALBUMIN SERPL ELPH-MCNC: 2.3 G/DL — LOW (ref 3.3–5)
ALP SERPL-CCNC: 70 U/L — SIGNIFICANT CHANGE UP (ref 40–120)
ALT FLD-CCNC: 22 U/L — SIGNIFICANT CHANGE UP (ref 12–78)
ANION GAP SERPL CALC-SCNC: 8 MMOL/L — SIGNIFICANT CHANGE UP (ref 5–17)
AST SERPL-CCNC: 24 U/L — SIGNIFICANT CHANGE UP (ref 15–37)
BASOPHILS # BLD AUTO: 0.07 K/UL — SIGNIFICANT CHANGE UP (ref 0–0.2)
BASOPHILS NFR BLD AUTO: 0.4 % — SIGNIFICANT CHANGE UP (ref 0–2)
BILIRUB SERPL-MCNC: 0.3 MG/DL — SIGNIFICANT CHANGE UP (ref 0.2–1.2)
BUN SERPL-MCNC: 13 MG/DL — SIGNIFICANT CHANGE UP (ref 7–23)
CALCIUM SERPL-MCNC: 7.7 MG/DL — LOW (ref 8.5–10.1)
CHLORIDE SERPL-SCNC: 108 MMOL/L — SIGNIFICANT CHANGE UP (ref 96–108)
CO2 SERPL-SCNC: 23 MMOL/L — SIGNIFICANT CHANGE UP (ref 22–31)
CREAT SERPL-MCNC: 0.8 MG/DL — SIGNIFICANT CHANGE UP (ref 0.5–1.3)
CULTURE RESULTS: SIGNIFICANT CHANGE UP
CULTURE RESULTS: SIGNIFICANT CHANGE UP
EOSINOPHIL # BLD AUTO: 0.7 K/UL — HIGH (ref 0–0.5)
EOSINOPHIL NFR BLD AUTO: 4.4 % — SIGNIFICANT CHANGE UP (ref 0–6)
GLUCOSE SERPL-MCNC: 90 MG/DL — SIGNIFICANT CHANGE UP (ref 70–99)
HCT VFR BLD CALC: 37.8 % — SIGNIFICANT CHANGE UP (ref 34.5–45)
HGB BLD-MCNC: 12.3 G/DL — SIGNIFICANT CHANGE UP (ref 11.5–15.5)
IMM GRANULOCYTES NFR BLD AUTO: 0.8 % — SIGNIFICANT CHANGE UP (ref 0–1.5)
LYMPHOCYTES # BLD AUTO: 18.5 % — SIGNIFICANT CHANGE UP (ref 13–44)
LYMPHOCYTES # BLD AUTO: 2.94 K/UL — SIGNIFICANT CHANGE UP (ref 1–3.3)
MCHC RBC-ENTMCNC: 29.2 PG — SIGNIFICANT CHANGE UP (ref 27–34)
MCHC RBC-ENTMCNC: 32.5 GM/DL — SIGNIFICANT CHANGE UP (ref 32–36)
MCV RBC AUTO: 89.8 FL — SIGNIFICANT CHANGE UP (ref 80–100)
METHOD TYPE: SIGNIFICANT CHANGE UP
MONOCYTES # BLD AUTO: 1.3 K/UL — HIGH (ref 0–0.9)
MONOCYTES NFR BLD AUTO: 8.2 % — SIGNIFICANT CHANGE UP (ref 2–14)
NEUTROPHILS # BLD AUTO: 10.72 K/UL — HIGH (ref 1.8–7.4)
NEUTROPHILS NFR BLD AUTO: 67.7 % — SIGNIFICANT CHANGE UP (ref 43–77)
ORGANISM # SPEC MICROSCOPIC CNT: SIGNIFICANT CHANGE UP
PLATELET # BLD AUTO: 179 K/UL — SIGNIFICANT CHANGE UP (ref 150–400)
POTASSIUM SERPL-MCNC: 3.4 MMOL/L — LOW (ref 3.5–5.3)
POTASSIUM SERPL-SCNC: 3.4 MMOL/L — LOW (ref 3.5–5.3)
PROT SERPL-MCNC: 6.2 GM/DL — SIGNIFICANT CHANGE UP (ref 6–8.3)
RBC # BLD: 4.21 M/UL — SIGNIFICANT CHANGE UP (ref 3.8–5.2)
RBC # FLD: 14.4 % — SIGNIFICANT CHANGE UP (ref 10.3–14.5)
SODIUM SERPL-SCNC: 139 MMOL/L — SIGNIFICANT CHANGE UP (ref 135–145)
SPECIMEN SOURCE: SIGNIFICANT CHANGE UP
SPECIMEN SOURCE: SIGNIFICANT CHANGE UP
WBC # BLD: 15.85 K/UL — HIGH (ref 3.8–10.5)
WBC # FLD AUTO: 15.85 K/UL — HIGH (ref 3.8–10.5)

## 2021-09-29 PROCEDURE — 99233 SBSQ HOSP IP/OBS HIGH 50: CPT | Mod: GC

## 2021-09-29 RX ORDER — POTASSIUM CHLORIDE 20 MEQ
40 PACKET (EA) ORAL ONCE
Refills: 0 | Status: COMPLETED | OUTPATIENT
Start: 2021-09-29 | End: 2021-09-29

## 2021-09-29 RX ADMIN — Medication 650 MILLIGRAM(S): at 00:05

## 2021-09-29 RX ADMIN — ATORVASTATIN CALCIUM 10 MILLIGRAM(S): 80 TABLET, FILM COATED ORAL at 21:18

## 2021-09-29 RX ADMIN — Medication 650 MILLIGRAM(S): at 20:30

## 2021-09-29 RX ADMIN — Medication 40 MILLIEQUIVALENT(S): at 09:03

## 2021-09-29 RX ADMIN — CEFTRIAXONE 2000 MILLIGRAM(S): 500 INJECTION, POWDER, FOR SOLUTION INTRAMUSCULAR; INTRAVENOUS at 12:30

## 2021-09-29 RX ADMIN — Medication 650 MILLIGRAM(S): at 19:57

## 2021-09-29 RX ADMIN — LOSARTAN POTASSIUM 25 MILLIGRAM(S): 100 TABLET, FILM COATED ORAL at 09:03

## 2021-09-29 RX ADMIN — ENOXAPARIN SODIUM 40 MILLIGRAM(S): 100 INJECTION SUBCUTANEOUS at 09:04

## 2021-09-29 RX ADMIN — SODIUM CHLORIDE 75 MILLILITER(S): 9 INJECTION INTRAMUSCULAR; INTRAVENOUS; SUBCUTANEOUS at 09:04

## 2021-09-29 NOTE — PROGRESS NOTE ADULT - ASSESSMENT
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      #Acute Metabolic Encephalopathy 2/2 acute sepsis  #Cystitis   - sepsis resolved  - clinically improving- no fevers, leukocytosis downtrending  - Blood cultures on 9/27 + for E. Coli, repeat cultures sent today- follow up  - continue rocephin  - TTE on 9/27: EF 50%, mild MR, TR  - trend  labs, monitor vitals  - Fall precautions  - PT consulted    #HDL  - Continue with statin    #HTN  - Monitor pressures  - DASH diet  - Continue with lisinopril    #Rheumatoid arthritis  - C/w humira injections  - C/w PRN prednisone for acute flares    #CODE STATUS- DNR/DNI Molst completed on 9/29 with Daughter Yelitza HCP    #Dispo  - Home with home care, f/u blood cultures      Above discussed with Dr. Ely     Patient is an 87 year old female, PMH of COVID, HTN, HDL rheumatoid arthritis on Humira and prednisone c/o weakness and fatigue.      #Acute Metabolic Encephalopathy 2/2 acute sepsis  #Cystitis   - sepsis resolved  - clinically improving- no fevers, leukocytosis downtrending  - Blood cultures on 9/27 + for E. Coli, repeat cultures sent today- follow up  - continue rocephin  - TTE on 9/27: EF 50%, mild MR, TR  - trend  labs, monitor vitals  - Fall precautions  - PT consulted    #Hypokalemia  - replete, f/u AM bmp    #HDL  - Continue with statin    #HTN  - Monitor pressures  - DASH diet  - Continue with lisinopril    #Rheumatoid arthritis  - C/w humira injections  - C/w PRN prednisone for acute flares    #CODE STATUS- DNR/DNI Molst completed on 9/29 with Daughter Yelitza HCP    #Dispo  - Home with home care, f/u blood cultures      Above discussed with Dr. Ely

## 2021-09-29 NOTE — PROGRESS NOTE ADULT - ASSESSMENT
Patient is an 87 year old female, PMH of COVID, HTN, HLD rheumatoid arthritis on humira and prednisone, admitted on  for evaluation of weakness and fatigue; she has not felt the same since having covid infection, from which her  . She was having urinary urgency and frequency with mild fever and rapid heart rate upon admission.   1. Patient admitted with sepsis secondary to E coli most likely of urinary origin given cystitis seen on imaging, also noted with leukocytosis most likely reactive to infection  - follow up cultures to sensitivity  - iv hydration and supportive care   - serial cbc and monitor temperature   - reviewed prior medical records to evaluate for resistant or atypical pathogens   - will continue the ceftriaxone ordered at 2 grams daily, day #2  - tolerating antibiotics without rashes or side effects   -  repeat blood cultures are pending  2. other issues: pe medicine

## 2021-09-30 LAB
-  AMIKACIN: SIGNIFICANT CHANGE UP
-  AMOXICILLIN/CLAVULANIC ACID: SIGNIFICANT CHANGE UP
-  AMPICILLIN/SULBACTAM: SIGNIFICANT CHANGE UP
-  AMPICILLIN: SIGNIFICANT CHANGE UP
-  AZTREONAM: SIGNIFICANT CHANGE UP
-  CEFAZOLIN: SIGNIFICANT CHANGE UP
-  CEFEPIME: SIGNIFICANT CHANGE UP
-  CEFOXITIN: SIGNIFICANT CHANGE UP
-  CEFTRIAXONE: SIGNIFICANT CHANGE UP
-  CIPROFLOXACIN: SIGNIFICANT CHANGE UP
-  ERTAPENEM: SIGNIFICANT CHANGE UP
-  GENTAMICIN: SIGNIFICANT CHANGE UP
-  IMIPENEM: SIGNIFICANT CHANGE UP
-  LEVOFLOXACIN: SIGNIFICANT CHANGE UP
-  MEROPENEM: SIGNIFICANT CHANGE UP
-  NITROFURANTOIN: SIGNIFICANT CHANGE UP
-  PIPERACILLIN/TAZOBACTAM: SIGNIFICANT CHANGE UP
-  TIGECYCLINE: SIGNIFICANT CHANGE UP
-  TOBRAMYCIN: SIGNIFICANT CHANGE UP
-  TRIMETHOPRIM/SULFAMETHOXAZOLE: SIGNIFICANT CHANGE UP
ANION GAP SERPL CALC-SCNC: 6 MMOL/L — SIGNIFICANT CHANGE UP (ref 5–17)
BUN SERPL-MCNC: 9 MG/DL — SIGNIFICANT CHANGE UP (ref 7–23)
CALCIUM SERPL-MCNC: 8 MG/DL — LOW (ref 8.5–10.1)
CHLORIDE SERPL-SCNC: 111 MMOL/L — HIGH (ref 96–108)
CO2 SERPL-SCNC: 25 MMOL/L — SIGNIFICANT CHANGE UP (ref 22–31)
CREAT SERPL-MCNC: 0.81 MG/DL — SIGNIFICANT CHANGE UP (ref 0.5–1.3)
CULTURE RESULTS: SIGNIFICANT CHANGE UP
GLUCOSE SERPL-MCNC: 126 MG/DL — HIGH (ref 70–99)
HCT VFR BLD CALC: 35.1 % — SIGNIFICANT CHANGE UP (ref 34.5–45)
HGB BLD-MCNC: 11.2 G/DL — LOW (ref 11.5–15.5)
MCHC RBC-ENTMCNC: 28.4 PG — SIGNIFICANT CHANGE UP (ref 27–34)
MCHC RBC-ENTMCNC: 31.9 GM/DL — LOW (ref 32–36)
MCV RBC AUTO: 88.9 FL — SIGNIFICANT CHANGE UP (ref 80–100)
METHOD TYPE: SIGNIFICANT CHANGE UP
ORGANISM # SPEC MICROSCOPIC CNT: SIGNIFICANT CHANGE UP
ORGANISM # SPEC MICROSCOPIC CNT: SIGNIFICANT CHANGE UP
PLATELET # BLD AUTO: 216 K/UL — SIGNIFICANT CHANGE UP (ref 150–400)
POTASSIUM SERPL-MCNC: 3.7 MMOL/L — SIGNIFICANT CHANGE UP (ref 3.5–5.3)
POTASSIUM SERPL-SCNC: 3.7 MMOL/L — SIGNIFICANT CHANGE UP (ref 3.5–5.3)
RBC # BLD: 3.95 M/UL — SIGNIFICANT CHANGE UP (ref 3.8–5.2)
RBC # FLD: 14.3 % — SIGNIFICANT CHANGE UP (ref 10.3–14.5)
SODIUM SERPL-SCNC: 142 MMOL/L — SIGNIFICANT CHANGE UP (ref 135–145)
SPECIMEN SOURCE: SIGNIFICANT CHANGE UP
WBC # BLD: 11.67 K/UL — HIGH (ref 3.8–10.5)
WBC # FLD AUTO: 11.67 K/UL — HIGH (ref 3.8–10.5)

## 2021-09-30 PROCEDURE — 99233 SBSQ HOSP IP/OBS HIGH 50: CPT | Mod: GC

## 2021-09-30 RX ORDER — CEFUROXIME AXETIL 250 MG
250 TABLET ORAL EVERY 12 HOURS
Refills: 0 | Status: DISCONTINUED | OUTPATIENT
Start: 2021-09-30 | End: 2021-10-03

## 2021-09-30 RX ADMIN — CEFTRIAXONE 2000 MILLIGRAM(S): 500 INJECTION, POWDER, FOR SOLUTION INTRAMUSCULAR; INTRAVENOUS at 09:45

## 2021-09-30 RX ADMIN — ENOXAPARIN SODIUM 40 MILLIGRAM(S): 100 INJECTION SUBCUTANEOUS at 09:45

## 2021-09-30 RX ADMIN — SODIUM CHLORIDE 75 MILLILITER(S): 9 INJECTION INTRAMUSCULAR; INTRAVENOUS; SUBCUTANEOUS at 02:03

## 2021-09-30 RX ADMIN — Medication 250 MILLIGRAM(S): at 17:42

## 2021-09-30 RX ADMIN — Medication 650 MILLIGRAM(S): at 20:59

## 2021-09-30 RX ADMIN — ATORVASTATIN CALCIUM 10 MILLIGRAM(S): 80 TABLET, FILM COATED ORAL at 21:00

## 2021-09-30 RX ADMIN — Medication 650 MILLIGRAM(S): at 21:29

## 2021-09-30 RX ADMIN — LOSARTAN POTASSIUM 25 MILLIGRAM(S): 100 TABLET, FILM COATED ORAL at 08:04

## 2021-09-30 NOTE — PROGRESS NOTE ADULT - ASSESSMENT
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      #Acute Metabolic Encephalopathy 2/2 acute sepsis  - sepsis resolved  - clinically improving- no fevers, leukocytosis downtrending  - Blood cultures on 9/27 + for E. Coli, repeat cultures sent today- follow up  - continue rocephin  - TTE on 9/27: EF 50%, mild MR, TR  - trend  labs, monitor vitals  - Fall precautions  - PT consulted    #Cystitis  -Improving  -Urine culture positive for E-coli  - Ceftriaxone 2gm iv every 24 hours    #Hypokalemia  -Resolved    #HDL  - Continue with statin    #HTN  - Monitor pressures  - DASH diet  - Continue with losartan. If bp fluctuates we will consider to elevate losartan dosage      #Rheumatoid arthritis  - C/w Humira injections  - C/w PRN prednisone for acute flares    #CODE STATUS- DNR/DNI Molst completed on 9/29 with Daughter Yelitza HCP    #Dispo  - Home with home care, f/u blood cultures      Above discussed with Dr. Ely

## 2021-09-30 NOTE — PROGRESS NOTE ADULT - ASSESSMENT
Patient is an 87 year old female, PMH of COVID, HTN, HLD rheumatoid arthritis on humira and prednisone, admitted on  for evaluation of weakness and fatigue; she has not felt the same since having covid infection, from which her  . She was having urinary urgency and frequency with mild fever and rapid heart rate upon admission.   1. Patient admitted with sepsis secondary to E coli most likely of urinary origin given cystitis seen on imaging, also noted with leukocytosis most likely reactive to infection  - follow up cultures to sensitivity  - iv hydration and supportive care   - serial cbc and monitor temperature   - reviewed prior medical records to evaluate for resistant or atypical pathogens   - will continue the ceftriaxone ordered at 2 grams daily, day #3  - will change to ceftin 250 mg po q 12 hours for 11 more days  - tolerating antibiotics without rashes or side effects   -  repeat blood cultures are no growth  2. other issues: pe medicine

## 2021-10-01 ENCOUNTER — TRANSCRIPTION ENCOUNTER (OUTPATIENT)
Age: 86
End: 2021-10-01

## 2021-10-01 LAB
ALBUMIN SERPL ELPH-MCNC: 2.5 G/DL — LOW (ref 3.3–5)
ALP SERPL-CCNC: 55 U/L — SIGNIFICANT CHANGE UP (ref 40–120)
ALT FLD-CCNC: 27 U/L — SIGNIFICANT CHANGE UP (ref 12–78)
ANION GAP SERPL CALC-SCNC: 4 MMOL/L — LOW (ref 5–17)
AST SERPL-CCNC: 21 U/L — SIGNIFICANT CHANGE UP (ref 15–37)
BASOPHILS # BLD AUTO: 0.05 K/UL — SIGNIFICANT CHANGE UP (ref 0–0.2)
BASOPHILS NFR BLD AUTO: 0.4 % — SIGNIFICANT CHANGE UP (ref 0–2)
BILIRUB SERPL-MCNC: 0.3 MG/DL — SIGNIFICANT CHANGE UP (ref 0.2–1.2)
BUN SERPL-MCNC: 11 MG/DL — SIGNIFICANT CHANGE UP (ref 7–23)
CALCIUM SERPL-MCNC: 8.3 MG/DL — LOW (ref 8.5–10.1)
CHLORIDE SERPL-SCNC: 107 MMOL/L — SIGNIFICANT CHANGE UP (ref 96–108)
CO2 SERPL-SCNC: 29 MMOL/L — SIGNIFICANT CHANGE UP (ref 22–31)
CREAT SERPL-MCNC: 0.76 MG/DL — SIGNIFICANT CHANGE UP (ref 0.5–1.3)
EOSINOPHIL # BLD AUTO: 0.85 K/UL — HIGH (ref 0–0.5)
EOSINOPHIL NFR BLD AUTO: 7.5 % — HIGH (ref 0–6)
GLUCOSE SERPL-MCNC: 104 MG/DL — HIGH (ref 70–99)
HCT VFR BLD CALC: 35.2 % — SIGNIFICANT CHANGE UP (ref 34.5–45)
HGB BLD-MCNC: 11.5 G/DL — SIGNIFICANT CHANGE UP (ref 11.5–15.5)
IMM GRANULOCYTES NFR BLD AUTO: 1.9 % — HIGH (ref 0–1.5)
LYMPHOCYTES # BLD AUTO: 3.43 K/UL — HIGH (ref 1–3.3)
LYMPHOCYTES # BLD AUTO: 30.3 % — SIGNIFICANT CHANGE UP (ref 13–44)
MCHC RBC-ENTMCNC: 28.8 PG — SIGNIFICANT CHANGE UP (ref 27–34)
MCHC RBC-ENTMCNC: 32.7 GM/DL — SIGNIFICANT CHANGE UP (ref 32–36)
MCV RBC AUTO: 88 FL — SIGNIFICANT CHANGE UP (ref 80–100)
MONOCYTES # BLD AUTO: 1.25 K/UL — HIGH (ref 0–0.9)
MONOCYTES NFR BLD AUTO: 11 % — SIGNIFICANT CHANGE UP (ref 2–14)
NEUTROPHILS # BLD AUTO: 5.53 K/UL — SIGNIFICANT CHANGE UP (ref 1.8–7.4)
NEUTROPHILS NFR BLD AUTO: 48.9 % — SIGNIFICANT CHANGE UP (ref 43–77)
PLATELET # BLD AUTO: 242 K/UL — SIGNIFICANT CHANGE UP (ref 150–400)
POTASSIUM SERPL-MCNC: 3.5 MMOL/L — SIGNIFICANT CHANGE UP (ref 3.5–5.3)
POTASSIUM SERPL-SCNC: 3.5 MMOL/L — SIGNIFICANT CHANGE UP (ref 3.5–5.3)
PROT SERPL-MCNC: 6.5 GM/DL — SIGNIFICANT CHANGE UP (ref 6–8.3)
RBC # BLD: 4 M/UL — SIGNIFICANT CHANGE UP (ref 3.8–5.2)
RBC # FLD: 14.1 % — SIGNIFICANT CHANGE UP (ref 10.3–14.5)
SODIUM SERPL-SCNC: 140 MMOL/L — SIGNIFICANT CHANGE UP (ref 135–145)
WBC # BLD: 11.33 K/UL — HIGH (ref 3.8–10.5)
WBC # FLD AUTO: 11.33 K/UL — HIGH (ref 3.8–10.5)

## 2021-10-01 PROCEDURE — 99232 SBSQ HOSP IP/OBS MODERATE 35: CPT | Mod: GC

## 2021-10-01 RX ORDER — LOSARTAN POTASSIUM 100 MG/1
1 TABLET, FILM COATED ORAL
Qty: 0 | Refills: 0 | DISCHARGE

## 2021-10-01 RX ORDER — LOSARTAN POTASSIUM 100 MG/1
40 TABLET, FILM COATED ORAL DAILY
Refills: 0 | Status: DISCONTINUED | OUTPATIENT
Start: 2021-10-01 | End: 2021-10-01

## 2021-10-01 RX ORDER — MULTIVIT-MIN/FERROUS GLUCONATE 9 MG/15 ML
1 LIQUID (ML) ORAL
Qty: 0 | Refills: 0 | DISCHARGE

## 2021-10-01 RX ORDER — LOSARTAN POTASSIUM 100 MG/1
25 TABLET, FILM COATED ORAL ONCE
Refills: 0 | Status: COMPLETED | OUTPATIENT
Start: 2021-10-01 | End: 2021-10-01

## 2021-10-01 RX ORDER — ADALIMUMAB 40MG/0.8ML
40 KIT SUBCUTANEOUS
Qty: 0 | Refills: 0 | DISCHARGE

## 2021-10-01 RX ORDER — LOSARTAN POTASSIUM 100 MG/1
50 TABLET, FILM COATED ORAL DAILY
Refills: 0 | Status: DISCONTINUED | OUTPATIENT
Start: 2021-10-02 | End: 2021-10-03

## 2021-10-01 RX ORDER — ACETAMINOPHEN 500 MG
2 TABLET ORAL
Qty: 0 | Refills: 0 | DISCHARGE

## 2021-10-01 RX ORDER — ADALIMUMAB 40MG/0.8ML
1 KIT SUBCUTANEOUS
Qty: 0 | Refills: 0 | DISCHARGE

## 2021-10-01 RX ADMIN — ENOXAPARIN SODIUM 40 MILLIGRAM(S): 100 INJECTION SUBCUTANEOUS at 07:55

## 2021-10-01 RX ADMIN — LOSARTAN POTASSIUM 25 MILLIGRAM(S): 100 TABLET, FILM COATED ORAL at 07:55

## 2021-10-01 RX ADMIN — LOSARTAN POTASSIUM 25 MILLIGRAM(S): 100 TABLET, FILM COATED ORAL at 14:08

## 2021-10-01 RX ADMIN — ATORVASTATIN CALCIUM 10 MILLIGRAM(S): 80 TABLET, FILM COATED ORAL at 22:15

## 2021-10-01 RX ADMIN — Medication 250 MILLIGRAM(S): at 17:43

## 2021-10-01 RX ADMIN — Medication 650 MILLIGRAM(S): at 22:16

## 2021-10-01 RX ADMIN — Medication 250 MILLIGRAM(S): at 05:46

## 2021-10-01 NOTE — PROGRESS NOTE ADULT - ASSESSMENT
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      #Acute Metabolic Encephalopathy 2/2 acute sepsis  - Sepsis resolved  - Clinically improving- no fevers, leukocytosis downtrending  - Blood cultures on 9/27 + for E. Coli,   - Repeat cultures - No growth so far  - Cefuroxime 500 po bid   - TTE on 9/27: EF 50%, mild MR, TR  - Trend  labs, monitor vitals  - Fall precautions  - PT consulted    #Cystitis  -Improving  -Urine culture positive for E-coli  - Ceftriaxone 2gm iv every 24 hours    #Hypokalemia  -Resolved    #HDL  - Continue with statin    #HTN  - Blood pressure Fluctuating- Increase Losartan to 40 mg  - DASH diet  -Will continue to monitor her blood pressure    #Rheumatoid arthritis  - C/w Humira injections  - C/w PRN prednisone for acute flares    #CODE STATUS- DNR/DNI Molst completed on 9/29 with Daughter Yelitza HCP    #Dispo  - Home with home care, f/u blood cultures      Above discussed with Dr. Ely     Patient is an 87 year old female, PMH of COVID, HTN, HDL rheumatoid arthritis on Humira and prednisone c/o weakness and fatigue.      #Acute Metabolic Encephalopathy 2/2 acute sepsis  - Sepsis resolved  - Clinically improving- No fevers, leukocytosis downtrending  - Blood cultures on 9/27 + for E. Coli,   - Repeat cultures - No growth so far  - Cefuroxime 250 po bid   - TTE on 9/27: EF 50%, mild MR, TR  - Trend  labs, monitor vitals  - Fall precautions  - PT consulted    #Cystitis  -Improving  -Urine culture positive for E-coli  -Cefuroxime 250 po bid     #Hypokalemia  -Resolved    #HDL  - Continue with statin    #HTN  - Blood pressure Fluctuating- Increase Losartan to 50 mg  - DASH diet  -Will continue to monitor her blood pressure    #Rheumatoid arthritis  - C/w Humira injections  - C/w PRN prednisone for acute flares    #CODE STATUS- DNR/DNI Molst completed on 9/29 with Daughter Yelitza HCP    #Dispo  - Pt will be discharged to home care once her blood pressure stabilizes.     -Above discussed with Dr. Abraham

## 2021-10-01 NOTE — CHART NOTE - NSCHARTNOTEFT_GEN_A_CORE
Pt seen and examined with house staff.  Plan formulated and reviewed on rounds    Briefly,  88 y/o female with HTN, RA on immunosuppressive and HL admitted with UTI sepsis-- E. coli bacteremia.  On CTX.  Repeat BCx NTD  Sitting in chair  ROS o/w negative   No events overnight     NAD  Stable vitals  Slightly hypertensive  No fevers    Grossly non focal     Labs reviewed    UTI sepsis with E. coli and bacteremia POA    On Ceftin now.  Anticipate DC if BCx remain negative over next 24 hrs    Med Surg

## 2021-10-01 NOTE — DISCHARGE NOTE NURSING/CASE MANAGEMENT/SOCIAL WORK - PATIENT PORTAL LINK FT
You can access the FollowMyHealth Patient Portal offered by [facility] by registering at the following website: [email or web address]. By joining [facility]’s FollowMyHealth portal, you will also be able to view your health information using other applications (apps) compatible with our system.

## 2021-10-01 NOTE — PHARMACOTHERAPY INTERVENTION NOTE - COMMENTS
.  Completed medication history as per patient and confirmed with Dr. Barrow  - Patient has been taking Humira long term (Last administered 09/24, next dose due 10/08 on a biweekly basis) and takes Prednisone if needed for acute flares (Last needed about a month ago)  - Prescribed clonazepam 0.25 mg ODT (0.5 tab BID PRN anxiety related to  passing earlier this year) but has not used it since June  - Has a history of taking escitalopram 10 mg daily but has not taken it in the months prior to admission (Patient states it was well tolerated, but just stopped taking it)  - Patient states she is taking Pravastatin for cholesterol (Picked up from [facility]) but could not confirm dosing; called [facility] to confirm, no record of prescription on file  - Sulfa allergy: Patient had stomach upset and diarrhea associated with sulfa in the past (Not a true allergy)  - Pharmacy preferrence: [facility] [address] (Requests her medications be delivered from [facility] to her home on discharge) .  Completed medication history as per patient and confirmed with Dr. Barrow  - Patient has been taking Humira long term (Last administered 09/24, next dose due 10/08 on a biweekly basis) and takes Prednisone if needed for acute flares (Last needed about a month ago)  - Prescribed clonazepam 0.25 mg ODT (0.5 tab BID PRN anxiety related to  passing earlier this year) but has not used it since June  - Has a history of taking escitalopram 10 mg daily but has not taken it in the months prior to admission (Patient states it was well tolerated, but just stopped taking it)  - Patient states she is taking Pravastatin for cholesterol (Picked up from [facility]) but could not confirm dosing; called [facility] to confirm, no record of prescription on file; Called Dr. Durand's office and spoke to Kelley, patient was prescribed Pravastatin 40 mg daily x 90 days with 0 refills 02/2021, unsure if patient is taking it at home  - Sulfa allergy: Patient had stomach upset and diarrhea associated with sulfa in the past (Not a true allergy)  - Pharmacy preference: [facility] [address] (Requests her medications be delivered from [facility] to her home on discharge)

## 2021-10-02 LAB
ALBUMIN SERPL ELPH-MCNC: 2.6 G/DL — LOW (ref 3.3–5)
ALP SERPL-CCNC: 58 U/L — SIGNIFICANT CHANGE UP (ref 40–120)
ALT FLD-CCNC: 29 U/L — SIGNIFICANT CHANGE UP (ref 12–78)
ANION GAP SERPL CALC-SCNC: 7 MMOL/L — SIGNIFICANT CHANGE UP (ref 5–17)
AST SERPL-CCNC: 20 U/L — SIGNIFICANT CHANGE UP (ref 15–37)
BASOPHILS # BLD AUTO: 0.25 K/UL — HIGH (ref 0–0.2)
BASOPHILS NFR BLD AUTO: 2 % — SIGNIFICANT CHANGE UP (ref 0–2)
BILIRUB SERPL-MCNC: 0.2 MG/DL — SIGNIFICANT CHANGE UP (ref 0.2–1.2)
BUN SERPL-MCNC: 16 MG/DL — SIGNIFICANT CHANGE UP (ref 7–23)
CALCIUM SERPL-MCNC: 8.8 MG/DL — SIGNIFICANT CHANGE UP (ref 8.5–10.1)
CHLORIDE SERPL-SCNC: 107 MMOL/L — SIGNIFICANT CHANGE UP (ref 96–108)
CO2 SERPL-SCNC: 27 MMOL/L — SIGNIFICANT CHANGE UP (ref 22–31)
CREAT SERPL-MCNC: 0.93 MG/DL — SIGNIFICANT CHANGE UP (ref 0.5–1.3)
EOSINOPHIL # BLD AUTO: 0.75 K/UL — HIGH (ref 0–0.5)
EOSINOPHIL NFR BLD AUTO: 6 % — SIGNIFICANT CHANGE UP (ref 0–6)
GLUCOSE SERPL-MCNC: 99 MG/DL — SIGNIFICANT CHANGE UP (ref 70–99)
HCT VFR BLD CALC: 34 % — LOW (ref 34.5–45)
HGB BLD-MCNC: 11 G/DL — LOW (ref 11.5–15.5)
LYMPHOCYTES # BLD AUTO: 42 % — SIGNIFICANT CHANGE UP (ref 13–44)
LYMPHOCYTES # BLD AUTO: 5.28 K/UL — HIGH (ref 1–3.3)
MCHC RBC-ENTMCNC: 28.7 PG — SIGNIFICANT CHANGE UP (ref 27–34)
MCHC RBC-ENTMCNC: 32.4 GM/DL — SIGNIFICANT CHANGE UP (ref 32–36)
MCV RBC AUTO: 88.8 FL — SIGNIFICANT CHANGE UP (ref 80–100)
MONOCYTES # BLD AUTO: 1.13 K/UL — HIGH (ref 0–0.9)
MONOCYTES NFR BLD AUTO: 9 % — SIGNIFICANT CHANGE UP (ref 2–14)
NEUTROPHILS # BLD AUTO: 4.9 K/UL — SIGNIFICANT CHANGE UP (ref 1.8–7.4)
NEUTROPHILS NFR BLD AUTO: 39 % — LOW (ref 43–77)
NRBC # BLD: SIGNIFICANT CHANGE UP /100 WBCS (ref 0–0)
PLATELET # BLD AUTO: 283 K/UL — SIGNIFICANT CHANGE UP (ref 150–400)
POTASSIUM SERPL-MCNC: 4.1 MMOL/L — SIGNIFICANT CHANGE UP (ref 3.5–5.3)
POTASSIUM SERPL-SCNC: 4.1 MMOL/L — SIGNIFICANT CHANGE UP (ref 3.5–5.3)
PROT SERPL-MCNC: 6.4 GM/DL — SIGNIFICANT CHANGE UP (ref 6–8.3)
RBC # BLD: 3.83 M/UL — SIGNIFICANT CHANGE UP (ref 3.8–5.2)
RBC # FLD: 14.3 % — SIGNIFICANT CHANGE UP (ref 10.3–14.5)
SODIUM SERPL-SCNC: 141 MMOL/L — SIGNIFICANT CHANGE UP (ref 135–145)
WBC # BLD: 12.57 K/UL — HIGH (ref 3.8–10.5)
WBC # FLD AUTO: 12.57 K/UL — HIGH (ref 3.8–10.5)

## 2021-10-02 PROCEDURE — 99232 SBSQ HOSP IP/OBS MODERATE 35: CPT | Mod: GC

## 2021-10-02 RX ADMIN — Medication 250 MILLIGRAM(S): at 16:58

## 2021-10-02 RX ADMIN — ENOXAPARIN SODIUM 40 MILLIGRAM(S): 100 INJECTION SUBCUTANEOUS at 09:31

## 2021-10-02 RX ADMIN — Medication 250 MILLIGRAM(S): at 05:29

## 2021-10-02 RX ADMIN — LOSARTAN POTASSIUM 50 MILLIGRAM(S): 100 TABLET, FILM COATED ORAL at 09:32

## 2021-10-02 RX ADMIN — ATORVASTATIN CALCIUM 10 MILLIGRAM(S): 80 TABLET, FILM COATED ORAL at 21:10

## 2021-10-02 RX ADMIN — Medication 650 MILLIGRAM(S): at 22:49

## 2021-10-02 NOTE — PROGRESS NOTE ADULT - SUBJECTIVE AND OBJECTIVE BOX
Date of service: 09-29-21 @ 15:27      Patient sitting in chair; overall improved, no complaints, afebrile      ROS: no fever or chills; denies dizziness, no HA, no SOB or cough, no abdominal pain, no diarrhea or constipation; no dysuria, no urinary frequency, no legs pain, no rashes    MEDICATIONS  (STANDING):  atorvastatin 10 milliGRAM(s) Oral at bedtime  cefTRIAXone Injectable. 2000 milliGRAM(s) IV Push every 24 hours  enoxaparin Injectable 40 milliGRAM(s) SubCutaneous daily  influenza   Vaccine 0.5 milliLiter(s) IntraMuscular once  losartan 25 milliGRAM(s) Oral daily  sodium chloride 0.9%. 1000 milliLiter(s) (75 mL/Hr) IV Continuous <Continuous>    MEDICATIONS  (PRN):  acetaminophen   Tablet .. 650 milliGRAM(s) Oral every 6 hours PRN Mild Pain (1 - 3)  aluminum hydroxide/magnesium hydroxide/simethicone Suspension 30 milliLiter(s) Oral every 4 hours PRN Dyspepsia  melatonin 3 milliGRAM(s) Oral at bedtime PRN Insomnia  ondansetron Injectable 4 milliGRAM(s) IV Push every 6 hours PRN Nausea and/or Vomiting  predniSONE   Tablet 5 milliGRAM(s) Oral daily PRN for ra flares      Vital Signs Last 24 Hrs  T(C): 37.2 (29 Sep 2021 08:13), Max: 37.2 (29 Sep 2021 08:13)  T(F): 99 (29 Sep 2021 08:13), Max: 99 (29 Sep 2021 08:13)  HR: 90 (29 Sep 2021 08:13) (82 - 90)  BP: 156/72 (29 Sep 2021 08:13) (148/76 - 156/72)  BP(mean): --  RR: 19 (29 Sep 2021 08:13) (18 - 19)  SpO2: 94% (29 Sep 2021 08:13) (94% - 99%)        Physical Exam:        Constitutional: frail looking  HEENT: NC/AT, EOMI, PERRLA, conjunctivae clear; ears and nose atraumatic; pharynx clear  Neck: supple; thyroid not palpable  Back: no tenderness  Respiratory: respiratory effort normal; clear to auscultation  Cardiovascular: S1S2 regular, no murmurs  Abdomen: soft, not tender, not distended, positive BS; no liver or spleen organomegaly  Genitourinary: no suprapubic tenderness  Musculoskeletal: no muscle tenderness, no joint swelling or tenderness  Neurological/ Psychiatric: AxOx3, judgement and insight normal;  moving all extremities  Skin: no rashes; no palpable lesions    Labs: all available labs reviewed                         Labs:                        12.3   15.85 )-----------( 179      ( 29 Sep 2021 06:33 )             37.8     09-29    139  |  108  |  13  ----------------------------<  90  3.4<L>   |  23  |  0.80    Ca    7.7<L>      29 Sep 2021 06:33    TPro  6.2  /  Alb  2.3<L>  /  TBili  0.3  /  DBili  x   /  AST  24  /  ALT  22  /  AlkPhos  70  09-29           Cultures:       Culture - Blood (collected 09-27-21 @ 02:01)  Source: .Blood None  Gram Stain (09-27-21 @ 19:47):    Growth in aerobic and anaerobic bottles: Gram Negative Rods  Preliminary Report (09-28-21 @ 15:43):    Growth in aerobic and anaerobic bottles: Escherichia coli    ***Blood Panel PCR results on this specimen are available    approximately 3 hours after the Gram stain result.***    Gram stain, PCR, and/or culture results may not always    correspond due to difference in methodologies.    ************************************************************    This PCR assay was performed by multiplex PCR. This    Assay tests for 66 bacterial and resistance gene targets.    Please refer to the [facility] test directory    at [email or web address] for details.  Organism: Blood Culture PCR (09-27-21 @ 23:04)  Organism: Blood Culture PCR (09-27-21 @ 23:04)      -  Escherichia coli: Detec      Method Type: PCR    Culture - Blood (collected 09-27-21 @ 01:54)  Source: .Blood None  Gram Stain (09-27-21 @ 19:49):    Growth in aerobic and anaerobic bottles: Gram Negative Rods  Preliminary Report (09-28-21 @ 15:45):    Growth in aerobic and anaerobic bottles: Escherichia coli    See previous culture [id number]    Culture - Urine (collected 09-26-21 @ 23:26)  Source: Clean Catch None  Preliminary Report (09-29-21 @ 13:44):    >100,000 CFU/ml Escherichia coli            < from: CT Abdomen and Pelvis w/ IV Cont (09.26.21 @ 23:01) >    EXAM:  CT ABDOMEN AND PELVIS IC                            PROCEDURE DATE:  09/26/2021          INTERPRETATION:  CLINICAL INFORMATION: Diarrhea and weakness    COMPARISON: None.    CONTRAST/COMPLICATIONS:  IV Contrast: Omnipaque 350  90 cc administered   10 cc discarded  Oral Contrast: NONE  Complications: None reported at time of study completion    PROCEDURE:  CT of the Abdomen and Pelvis was performed.  Sagittal and coronal reformats were performed.    FINDINGS:  LOWER CHEST: Heart is enlarged. Nonspecific interstitial and groundglass opacities at the lung bases.    LIVER: Lobulated cystic lesion measuring 1.8 cm in the inferior right lobe.  BILE DUCTS: Normal caliber.  GALLBLADDER: Within normal limits.  SPLEEN: Within normal limits.  PANCREAS: Atrophic.  ADRENALS: Within normal limits.  KIDNEYS/URETERS: No hydronephrosis. Small right renal cyst. Urothelial enhancement of the bilateral renal pelves and ureters suggesting ascending urinary tract infection.    BLADDER: Incompletely distended with wall thickening suggesting cystitis. REPRODUCTIVE ORGANS: No pelvic masses.    BOWEL: Small hiatal hernia. No bowel obstruction. Appendix is unremarkable. The colon is underdistended without significant fecal load. Diffuse colonic diverticulosis.  PERITONEUM: No ascites.  VESSELS: Aorta is not dilated. Moderate atherosclerotic vascular calcification.  RETROPERITONEUM/LYMPH NODES: No lymphadenopathy.  ABDOMINAL WALL: Within normal limits.  BONES: Within normal limits.    IMPRESSION:    Cystitis with bilateral ureteritis. Correlate clinically for pyelonephritis  No convincing focal bowel wall thickening.  Diffuse colonic diverticulosis.    < end of copied text >                Advanced directives addressed: full resuscitation
Patient is an 87 year old female, PMH of COVID, HTN, HLD rheumatoid arthritis on humira and predinsone, c/o weakness and fatigue. During the interview, patient is slightly confused and AAOx2. She endorses generalized fatigue since COVID infection in early 2021, but also notes that she has residual weakness from the infx, that is more prominent in the extremities, necessitating a walker for ambulation and also causing her to have bm and urinary accidents because she cannot make it to the bathroom in time. Patient was at baseline until two days ago ambulating around her apartment, when she experienced episode of bilateral motor weakness of the lower extremities, and fell to the floor. No alleviating or aggravating factors. She did not hit her head, or notice any other antecedent or consequent trauma. Patient denies all other symptoms including ha, syncope, palpitations, sensory changes, pain or change of temperature in the lower extremities, tongue-biting or loss of control/changes in BM or urination. She lives alone and endorses having to "drag herself around all day" using her arms, until the weakness spontaneously resolved later that day. She has never symptoms like this before. When patient experienced similar episode the next day, patient's daughter encouraged her to go to hospital and she was brought in by ambulance to ED for evaluation of weakness.In the ED, patient was found to be slightly febrile at 99.6 w/ HR at 107, and elevated WBC at 23.02. Creatine kinase is 403; Urinalysis is positive for nitrites, LE, WBC, bacteria TNTC and large blood. CT abdomen showed Cystitis with bilateral ureteritis, possibly pyelonephritis. Patient was given NS 2 L bolus + dose of ceftriaxone and tylenol, and will be admitted for further workup.      Pt seen by her bedside, not in acute distress, answering the questions well feeling much better than before. Presently, pt have no complaints.     REVIEW OF SYSTEMS:  All other review of systems is negative unless indicated above    Vital Signs Last 24 Hrs  T(C): 36.9 (02 Oct 2021 07:44), Max: 36.9 (01 Oct 2021 16:56)  T(F): 98.5 (02 Oct 2021 07:44), Max: 98.5 (01 Oct 2021 16:56)  HR: 95 (02 Oct 2021 07:44) (80 - 95)  BP: 158/57 (02 Oct 2021 07:44) (130/44 - 168/82)  BP(mean): --  RR: 18 (02 Oct 2021 07:44) (18 - 18)  SpO2: 94% (02 Oct 2021 07:44) (94% - 99%)    PHYSICAL EXAM:  GENERAL: NAD, lying in bed comfortably  HEAD:  Atraumatic, Normocephalic  EYES: EOMI, PERRLA, conjunctiva and sclera clear  ENT: Moist mucous membranes  NECK: Supple, No JVD  CHEST/LUNG:  Mild crepitation bilaterally.   HEART: Regular rate and rhythm; No murmurs, rubs, or gallops  ABDOMEN: Bowel sounds present; Soft, Nontender, Nondistended.  EXTREMITIES:  2+ Peripheral Pulses, brisk capillary refill. No clubbing, cyanosis, or edema  NERVOUS SYSTEM:  Alert & Oriented X3, speech clear. No deficits   MSK: FROM all 4 extremities, full and equal strength  SKIN: No rashes or lesion    MEDICATIONS  (STANDING):  atorvastatin 10 milliGRAM(s) Oral at bedtime  cefuroxime   Tablet 250 milliGRAM(s) Oral every 12 hours  enoxaparin Injectable 40 milliGRAM(s) SubCutaneous daily  influenza   Vaccine 0.5 milliLiter(s) IntraMuscular once  losartan 50 milliGRAM(s) Oral daily    MEDICATIONS  (PRN):  acetaminophen   Tablet .. 650 milliGRAM(s) Oral every 6 hours PRN Mild Pain (1 - 3)  aluminum hydroxide/magnesium hydroxide/simethicone Suspension 30 milliLiter(s) Oral every 4 hours PRN Dyspepsia  melatonin 3 milliGRAM(s) Oral at bedtime PRN Insomnia  ondansetron Injectable 4 milliGRAM(s) IV Push every 6 hours PRN Nausea and/or Vomiting  predniSONE   Tablet 5 milliGRAM(s) Oral daily PRN for ra flares        LABS: All Labs Reviewed:                                          11.0   12.57 )-----------( 283      ( 02 Oct 2021 05:25 )             34.0   10-02    141  |  107  |  16  ----------------------------<  99  4.1   |  27  |  0.93    Ca    8.8      02 Oct 2021 05:25    TPro  6.4  /  Alb  2.6<L>  /  TBili  0.2  /  DBili  x   /  AST  20  /  ALT  29  /  AlkPhos  58  10-02      Blood Culture: 09-27 @ 02:01  Organism Blood Culture PCR  Gram Stain Blood -- Gram Stain   Growth in aerobic and anaerobic bottles: Gram Negative Rods  Specimen Source .Blood None  Culture-Blood --    09-27 @ 01:54  Organism --  Gram Stain Blood -- Gram Stain   Growth in aerobic and anaerobic bottles: Gram Negative Rods  Specimen Source .Blood None  Culture-Blood --    09-26 @ 23:26  Organism --  Gram Stain Blood -- Gram Stain --  Specimen Source Clean Catch None  Culture-Blood --      
Patient is an 87 year old female, PMH of COVID, HTN, HLD rheumatoid arthritis on humira and predinsone, c/o weakness and fatigue. During the interview, patient is slightly confused and AAOx2. She endorses generalized fatigue since COVID infection in early 2021, but also notes that she has residual weakness from the infx, that is more prominent in the extremities, necessitating a walker for ambulation and also causing her to have bm and urinary accidents because she cannot make it to the bathroom in time. Patient was at baseline until two days ago ambulating around her apartment, when she experienced episode of bilateral motor weakness of the lower extremities, and fell to the floor. No alleviating or aggravating factors. She did not hit her head, or notice any other antecedent or consequent trauma. Patient denies all other symptoms including ha, syncope, palpitations, sensory changes, pain or change of temperature in the lower extremities, tongue-biting or loss of control/changes in BM or urination. She lives alone and endorses having to "drag herself around all day" using her arms, until the weakness spontaneously resolved later that day. She has never symptoms like this before. When patient experienced similar episode the next day, patient's daughter encouraged her to go to hospital and she was brought in by ambulance to ED for evaluation of weakness.In the ED, patient was found to be slightly febrile at 99.6 w/ HR at 107, and elevated WBC at 23.02. Creatine kinase is 403; Urinalysis is positive for nitrites, LE, WBC, bacteria TNTC and large blood. CT abdomen showed Cystitis with bilateral ureteritis, possibly pyelonephritis. Patient was given NS 2 L bolus + dose of ceftriaxone and tylenol, and will be admitted for further workup.      Subjective-  Pt was seen by his bed side, not in distress, answering the questions well. Presently, pt have no complaints.    REVIEW OF SYSTEMS:    CONSTITUTIONAL: No weakness, fevers or chills  EYES/ENT: No visual changes;  No vertigo or throat pain   NECK: No pain or stiffness  RESPIRATORY: No cough, wheezing, hemoptysis; No shortness of breath  CARDIOVASCULAR: No chest pain or palpitations  GASTROINTESTINAL: No abdominal or epigastric pain. No nausea, vomiting, or hematemesis; No diarrhea or constipation. No melena or hematochezia.  GENITOURINARY: No dysuria, frequency or hematuria  NEUROLOGICAL: No numbness or weakness  SKIN: No itching, rashes    Physical examination-     GENERAL: NAD, lying in bed comfortably  HEAD:  Atraumatic, Normocephalic  EYES: EOMI, PERRLA, conjunctiva and sclera clear  ENT: Moist mucous membranes  NECK: Supple, No JVD  CHEST/LUNG:  Mild crepitation bilaterally.   HEART: Regular rate and rhythm; No murmurs, rubs, or gallops  ABDOMEN: Bowel sounds present; Soft, Nontender, Nondistended.  EXTREMITIES:  2+ Peripheral Pulses, brisk capillary refill. No clubbing, cyanosis, or edema  NERVOUS SYSTEM:  Alert & Oriented X3, speech clear. No deficits   MSK: FROM all 4 extremities, full and equal strength  SKIN: No rashes or lesions    Vital Signs Last 24 Hrs  T(C): 37 (28 Sep 2021 16:09), Max: 37.3 (28 Sep 2021 05:50)  T(F): 98.6 (28 Sep 2021 16:09), Max: 99.2 (28 Sep 2021 08:17)  HR: 82 (28 Sep 2021 16:09) (82 - 100)  BP: 148/76 (28 Sep 2021 16:09) (148/76 - 159/79)  BP(mean): --  RR: 18 (28 Sep 2021 16:09) (18 - 18)  SpO2: 99% (28 Sep 2021 16:09) (94% - 99%)    MEDICATIONS  (STANDING):  atorvastatin 10 milliGRAM(s) Oral at bedtime  cefTRIAXone Injectable. 2000 milliGRAM(s) IV Push every 24 hours  enoxaparin Injectable 40 milliGRAM(s) SubCutaneous daily  influenza   Vaccine 0.5 milliLiter(s) IntraMuscular once  losartan 25 milliGRAM(s) Oral daily  sodium chloride 0.9%. 1000 milliLiter(s) (75 mL/Hr) IV Continuous <Continuous>    MEDICATIONS  (PRN):  acetaminophen   Tablet .. 650 milliGRAM(s) Oral every 6 hours PRN Mild Pain (1 - 3)  aluminum hydroxide/magnesium hydroxide/simethicone Suspension 30 milliLiter(s) Oral every 4 hours PRN Dyspepsia  melatonin 3 milliGRAM(s) Oral at bedtime PRN Insomnia  ondansetron Injectable 4 milliGRAM(s) IV Push every 6 hours PRN Nausea and/or Vomiting  predniSONE   Tablet 5 milliGRAM(s) Oral daily PRN for ra flares                          11.5   24.19 )-----------( 178      ( 28 Sep 2021 06:40 )             35.0   09-28    137  |  107  |  14  ----------------------------<  120<H>  3.5   |  27  |  0.95    Ca    7.7<L>      28 Sep 2021 15:37  Mg     1.8     09-26    TPro  6.5  /  Alb  2.5<L>  /  TBili  0.2  /  DBili  x   /  AST  21  /  ALT  23  /  AlkPhos  66  09-28    < from: CT Abdomen and Pelvis w/ IV Cont (09.26.21 @ 23:01) >  IMPRESSION:    Cystitis with bilateral ureteritis. Correlate clinically for pyelonephritis  No convincing focal bowel wall thickening.  Diffuse colonic diverticulosis.    < end of copied text >  < from: Xray Chest 1 View- PORTABLE-Urgent (09.26.21 @ 21:48) >  INTERPRETATION:  AP chest on September 26, 2021 at 9:39 PM. Patient has chest pain.    COMPARISON: None available.    Heart magnified by technique.    No lung or pleural finding.    IMPRESSION: No acute finding.    < end of copied text >  
Patient is an 87 year old female, PMH of COVID, HTN, HLD rheumatoid arthritis on humira and predinsone, c/o weakness and fatigue. During the interview, patient is slightly confused and AAOx2. She endorses generalized fatigue since COVID infection in early 2021, but also notes that she has residual weakness from the infx, that is more prominent in the extremities, necessitating a walker for ambulation and also causing her to have bm and urinary accidents because she cannot make it to the bathroom in time. Patient was at baseline until two days ago ambulating around her apartment, when she experienced episode of bilateral motor weakness of the lower extremities, and fell to the floor. No alleviating or aggravating factors. She did not hit her head, or notice any other antecedent or consequent trauma. Patient denies all other symptoms including ha, syncope, palpitations, sensory changes, pain or change of temperature in the lower extremities, tongue-biting or loss of control/changes in BM or urination. She lives alone and endorses having to "drag herself around all day" using her arms, until the weakness spontaneously resolved later that day. She has never symptoms like this before. When patient experienced similar episode the next day, patient's daughter encouraged her to go to hospital and she was brought in by ambulance to ED for evaluation of weakness.In the ED, patient was found to be slightly febrile at 99.6 w/ HR at 107, and elevated WBC at 23.02. Creatine kinase is 403; Urinalysis is positive for nitrites, LE, WBC, bacteria TNTC and large blood. CT abdomen showed Cystitis with bilateral ureteritis, possibly pyelonephritis. Patient was given NS 2 L bolus + dose of ceftriaxone and tylenol, and will be admitted for further workup.      Pt seen by her bedside, not in acute distress, answering the questions well feeling much better than before. Presently, pt have no complaints.     REVIEW OF SYSTEMS:  All other review of systems is negative unless indicated above    Vital Signs Last 24 Hrs  T(C): 36.8 (01 Oct 2021 07:35), Max: 36.8 (30 Sep 2021 23:30)  T(F): 98.3 (01 Oct 2021 07:35), Max: 98.3 (01 Oct 2021 07:35)  HR: 80 (01 Oct 2021 14:08) (80 - 92)  BP: 155/75 (01 Oct 2021 14:08) (155/75 - 170/66)  BP(mean): --  RR: 18 (01 Oct 2021 07:35) (18 - 18)  SpO2: 97% (01 Oct 2021 07:35) (96% - 97%)      PHYSICAL EXAM:  GENERAL: NAD, lying in bed comfortably  HEAD:  Atraumatic, Normocephalic  EYES: EOMI, PERRLA, conjunctiva and sclera clear  ENT: Moist mucous membranes  NECK: Supple, No JVD  CHEST/LUNG:  Mild crepitation bilaterally.   HEART: Regular rate and rhythm; No murmurs, rubs, or gallops  ABDOMEN: Bowel sounds present; Soft, Nontender, Nondistended.  EXTREMITIES:  2+ Peripheral Pulses, brisk capillary refill. No clubbing, cyanosis, or edema  NERVOUS SYSTEM:  Alert & Oriented X3, speech clear. No deficits   MSK: FROM all 4 extremities, full and equal strength  SKIN: No rashes or lesion    MEDICATIONS  (STANDING):  atorvastatin 10 milliGRAM(s) Oral at bedtime  cefuroxime   Tablet 250 milliGRAM(s) Oral every 12 hours  enoxaparin Injectable 40 milliGRAM(s) SubCutaneous daily  influenza   Vaccine 0.5 milliLiter(s) IntraMuscular once    MEDICATIONS  (PRN):  acetaminophen   Tablet .. 650 milliGRAM(s) Oral every 6 hours PRN Mild Pain (1 - 3)  aluminum hydroxide/magnesium hydroxide/simethicone Suspension 30 milliLiter(s) Oral every 4 hours PRN Dyspepsia  melatonin 3 milliGRAM(s) Oral at bedtime PRN Insomnia  ondansetron Injectable 4 milliGRAM(s) IV Push every 6 hours PRN Nausea and/or Vomiting  predniSONE   Tablet 5 milliGRAM(s) Oral daily PRN for ra flares      LABS: All Labs Reviewed:                                       11.5   11.33 )-----------( 242      ( 01 Oct 2021 07:10 )             35.2   10-01    140  |  107  |  11  ----------------------------<  104<H>  3.5   |  29  |  0.76    Ca    8.3<L>      01 Oct 2021 07:10    TPro  6.5  /  Alb  2.5<L>  /  TBili  0.3  /  DBili  x   /  AST  21  /  ALT  27  /  AlkPhos  55  10-01    Blood Culture: 09-27 @ 02:01  Organism Blood Culture PCR  Gram Stain Blood -- Gram Stain   Growth in aerobic and anaerobic bottles: Gram Negative Rods  Specimen Source .Blood None  Culture-Blood --    09-27 @ 01:54  Organism --  Gram Stain Blood -- Gram Stain   Growth in aerobic and anaerobic bottles: Gram Negative Rods  Specimen Source .Blood None  Culture-Blood --    09-26 @ 23:26  Organism --  Gram Stain Blood -- Gram Stain --  Specimen Source Clean Catch None  Culture-Blood --      
  Date of service: 09-30-21 @ 16:12    Patient lying in bed; no specific complaints        ROS: no fever or chills; denies dizziness, no HA, no SOB or cough, no abdominal pain, no diarrhea or constipation; no dysuria, no urinary frequency, no legs pain, no rashes    MEDICATIONS  (STANDING):  atorvastatin 10 milliGRAM(s) Oral at bedtime  cefTRIAXone Injectable. 2000 milliGRAM(s) IV Push every 24 hours  enoxaparin Injectable 40 milliGRAM(s) SubCutaneous daily  influenza   Vaccine 0.5 milliLiter(s) IntraMuscular once  losartan 25 milliGRAM(s) Oral daily    MEDICATIONS  (PRN):  acetaminophen   Tablet .. 650 milliGRAM(s) Oral every 6 hours PRN Mild Pain (1 - 3)  aluminum hydroxide/magnesium hydroxide/simethicone Suspension 30 milliLiter(s) Oral every 4 hours PRN Dyspepsia  melatonin 3 milliGRAM(s) Oral at bedtime PRN Insomnia  ondansetron Injectable 4 milliGRAM(s) IV Push every 6 hours PRN Nausea and/or Vomiting  predniSONE   Tablet 5 milliGRAM(s) Oral daily PRN for ra flares      Vital Signs Last 24 Hrs  T(C): 36.8 (30 Sep 2021 08:16), Max: 36.8 (30 Sep 2021 08:16)  T(F): 98.3 (30 Sep 2021 08:16), Max: 98.3 (30 Sep 2021 08:16)  HR: 94 (30 Sep 2021 08:16) (68 - 94)  BP: 165/74 (30 Sep 2021 09:43) (146/80 - 179/72)  BP(mean): --  RR: 19 (30 Sep 2021 08:16) (18 - 19)  SpO2: 98% (30 Sep 2021 08:16) (98% - 98%)        Physical Exam:      Constitutional: frail looking  HEENT: NC/AT, EOMI, PERRLA, conjunctivae clear; ears and nose atraumatic; pharynx clear  Neck: supple; thyroid not palpable  Back: no tenderness  Respiratory: respiratory effort normal; clear to auscultation  Cardiovascular: S1S2 regular, no murmurs  Abdomen: soft, not tender, not distended, positive BS; no liver or spleen organomegaly  Genitourinary: no suprapubic tenderness  Musculoskeletal: no muscle tenderness, no joint swelling or tenderness  Neurological/ Psychiatric: AxOx3, judgement and insight normal;  moving all extremities  Skin: no rashes; no palpable lesions    Labs: all available labs reviewed          Labs:                        11.2   11.67 )-----------( 216      ( 30 Sep 2021 06:48 )             35.1     09-30    142  |  111<H>  |  9   ----------------------------<  126<H>  3.7   |  25  |  0.81    Ca    8.0<L>      30 Sep 2021 06:48    TPro  6.2  /  Alb  2.3<L>  /  TBili  0.3  /  DBili  x   /  AST  24  /  ALT  22  /  AlkPhos  70  09-29           Cultures:       Culture - Blood (collected 09-29-21 @ 11:26)  Source: .Blood None  Preliminary Report (09-30-21 @ 16:01):    No growth to date.    Culture - Blood (collected 09-29-21 @ 11:18)  Source: .Blood None  Preliminary Report (09-30-21 @ 16:01):    No growth to date.    Culture - Blood (collected 09-29-21 @ 06:33)  Source: .Blood None  Preliminary Report (09-30-21 @ 13:02):    No growth to date.    Culture - Blood (collected 09-29-21 @ 06:33)  Source: .Blood None  Preliminary Report (09-30-21 @ 13:02):    No growth to date.    Culture - Blood (collected 09-27-21 @ 02:01)  Source: .Blood None  Gram Stain (09-27-21 @ 19:47):    Growth in aerobic and anaerobic bottles: Gram Negative Rods  Final Report (09-29-21 @ 15:32):    Growth in aerobic and anaerobic bottles: Escherichia coli    ***Blood Panel PCR results on this specimen are available    approximately 3 hours after the Gram stain result.***    Gram stain, PCR, and/or culture results may not always    correspond due to difference in methodologies.    ************************************************************    This PCR assay was performed by multiplex PCR. This    Assay tests for 66 bacterial and resistance gene targets.    Please refer to the [facility] test directory    at [email or web address] for details.  Organism: Blood Culture PCR  Escherichia coli (09-29-21 @ 15:32)  Organism: Escherichia coli (09-29-21 @ 15:32)      -  Amikacin: S <=16      -  Ampicillin: S <=8 These ampicillin results predict results for amoxicillin      -  Ampicillin/Sulbactam: S <=4/2 Enterobacter, Citrobacter, and Serratia may develop resistance during prolonged therapy (3-4 days)      -  Aztreonam: S <=4      -  Cefazolin: S <=2 Enterobacter, Citrobacter, and Serratia may develop resistance during prolonged therapy (3-4 days)      -  Cefepime: S <=2      -  Cefoxitin: S <=8      -  Ceftriaxone: S <=1 Enterobacter, Citrobacter, and Serratia may develop resistance during prolonged therapy      -  Ciprofloxacin: I 0.5      -  Ertapenem: S <=0.5      -  Gentamicin: S <=2      -  Imipenem: S <=1      -  Levofloxacin: S <=0.5      -  Meropenem: S <=1      -  Piperacillin/Tazobactam: S <=8      -  Tobramycin: S <=2      -  Trimethoprim/Sulfamethoxazole: S <=0.5/9.5      Method Type: ALFIE  Organism: Blood Culture PCR (09-29-21 @ 15:32)      -  Escherichia coli: Detec      Method Type: PCR    Culture - Blood (collected 09-27-21 @ 01:54)  Source: .Blood None  Gram Stain (09-27-21 @ 19:49):    Growth in aerobic and anaerobic bottles: Gram Negative Rods  Final Report (09-29-21 @ 15:33):    Growth in aerobic and anaerobic bottles: Escherichia coli    See previous culture [id number]    Culture - Urine (collected 09-26-21 @ 23:26)  Source: Clean Catch None  Final Report (09-30-21 @ 10:59):    >100,000 CFU/ml Escherichia coli  Organism: Escherichia coli (09-30-21 @ 10:59)  Organism: Escherichia coli (09-30-21 @ 10:59)      -  Amikacin: S <=16      -  Amoxicillin/Clavulanic Acid: S <=8/4      -  Ampicillin: S <=8 These ampicillin results predict results for amoxicillin      -  Ampicillin/Sulbactam: S <=4/2 Enterobacter, Citrobacter, and Serratia may develop resistance during prolonged therapy (3-4 days)      -  Aztreonam: S <=4      -  Cefazolin: S <=2 (MIC_CL_COM_ENTERIC_CEFAZU) For uncomplicated UTI with K. pneumoniae, E. coli, or P. mirablis: ALFIE <=16 is sensitive and ALFIE >=32 is resistant. This also predicts results for oral agents cefaclor, cefdinir, cefpodoxime, cefprozil, cefuroxime axetil, cephalexin and locarbef for uncomplicated UTI. Note that some isolates may be susceptible to these agents while testing resistant to cefazolin.      -  Cefepime: S <=2      -  Cefoxitin: S <=8      -  Ceftriaxone: S <=1 Enterobacter, Citrobacter, and Serratia may develop resistance during prolonged therapy      -  Ciprofloxacin: I 0.5      -  Ertapenem: S <=0.5      -  Gentamicin: S <=2      -  Imipenem: S <=1      -  Levofloxacin: S <=0.5      -  Meropenem: S <=1      -  Nitrofurantoin: S <=32 Should not be used to treat pyelonephritis      -  Piperacillin/Tazobactam: S <=8      -  Tigecycline: S <=2      -  Tobramycin: S <=2      -  Trimethoprim/Sulfamethoxazole: S <=0.5/9.5      Method Type: ALFIE            < from: CT Abdomen and Pelvis w/ IV Cont (09.26.21 @ 23:01) >    EXAM:  CT ABDOMEN AND PELVIS IC                            PROCEDURE DATE:  09/26/2021          INTERPRETATION:  CLINICAL INFORMATION: Diarrhea and weakness    COMPARISON: None.    CONTRAST/COMPLICATIONS:  IV Contrast: Omnipaque 350  90 cc administered   10 cc discarded  Oral Contrast: NONE  Complications: None reported at time of study completion    PROCEDURE:  CT of the Abdomen and Pelvis was performed.  Sagittal and coronal reformats were performed.    FINDINGS:  LOWER CHEST: Heart is enlarged. Nonspecific interstitial and groundglass opacities at the lung bases.    LIVER: Lobulated cystic lesion measuring 1.8 cm in the inferior right lobe.  BILE DUCTS: Normal caliber.  GALLBLADDER: Within normal limits.  SPLEEN: Within normal limits.  PANCREAS: Atrophic.  ADRENALS: Within normal limits.  KIDNEYS/URETERS: No hydronephrosis. Small right renal cyst. Urothelial enhancement of the bilateral renal pelves and ureters suggesting ascending urinary tract infection.    BLADDER: Incompletely distended with wall thickening suggesting cystitis. REPRODUCTIVE ORGANS: No pelvic masses.    BOWEL: Small hiatal hernia. No bowel obstruction. Appendix is unremarkable. The colon is underdistended without significant fecal load. Diffuse colonic diverticulosis.  PERITONEUM: No ascites.  VESSELS: Aorta is not dilated. Moderate atherosclerotic vascular calcification.  RETROPERITONEUM/LYMPH NODES: No lymphadenopathy.  ABDOMINAL WALL: Within normal limits.  BONES: Within normal limits.    IMPRESSION:    Cystitis with bilateral ureteritis. Correlate clinically for pyelonephritis  No convincing focal bowel wall thickening.  Diffuse colonic diverticulosis.    < end of copied text >                Advanced directives addressed: full resuscitation
Patient is an 87 year old female, PMH of COVID, HTN, HLD rheumatoid arthritis on humira and predinsone, c/o weakness and fatigue. During the interview, patient is slightly confused and AAOx2. She endorses generalized fatigue since COVID infection in early 2021, but also notes that she has residual weakness from the infx, that is more prominent in the extremities, necessitating a walker for ambulation and also causing her to have bm and urinary accidents because she cannot make it to the bathroom in time. Patient was at baseline until two days ago ambulating around her apartment, when she experienced episode of bilateral motor weakness of the lower extremities, and fell to the floor. No alleviating or aggravating factors. She did not hit her head, or notice any other antecedent or consequent trauma. Patient denies all other symptoms including ha, syncope, palpitations, sensory changes, pain or change of temperature in the lower extremities, tongue-biting or loss of control/changes in BM or urination. She lives alone and endorses having to "drag herself around all day" using her arms, until the weakness spontaneously resolved later that day. She has never symptoms like this before. When patient experienced similar episode the next day, patient's daughter encouraged her to go to hospital and she was brought in by ambulance to ED for evaluation of weakness.In the ED, patient was found to be slightly febrile at 99.6 w/ HR at 107, and elevated WBC at 23.02. Creatine kinase is 403; Urinalysis is positive for nitrites, LE, WBC, bacteria TNTC and large blood. CT abdomen showed Cystitis with bilateral ureteritis, possibly pyelonephritis. Patient was given NS 2 L bolus + dose of ceftriaxone and tylenol, and will be admitted for further workup.      Pt seen by her bedside, not in acute distress, feeling much better than before. Presently, pt have no complaints.     REVIEW OF SYSTEMS:  All other review of systems is negative unless indicated above    Vital Signs Last 24 Hrs  T(C): 36.8 (30 Sep 2021 15:08), Max: 36.8 (30 Sep 2021 08:16)  T(F): 98.2 (30 Sep 2021 15:08), Max: 98.3 (30 Sep 2021 08:16)  HR: 90 (30 Sep 2021 15:08) (68 - 94)  BP: 165/70 (30 Sep 2021 16:29) (146/80 - 179/72)  BP(mean): --  RR: 18 (30 Sep 2021 15:08) (18 - 19)  SpO2: 96% (30 Sep 2021 15:08) (96% - 98%)  I&O's Summary      PHYSICAL EXAM:  GENERAL: NAD, lying in bed comfortably  HEAD:  Atraumatic, Normocephalic  EYES: EOMI, PERRLA, conjunctiva and sclera clear  ENT: Moist mucous membranes  NECK: Supple, No JVD  CHEST/LUNG:  Mild crepitation bilaterally.   HEART: Regular rate and rhythm; No murmurs, rubs, or gallops  ABDOMEN: Bowel sounds present; Soft, Nontender, Nondistended.  EXTREMITIES:  2+ Peripheral Pulses, brisk capillary refill. No clubbing, cyanosis, or edema  NERVOUS SYSTEM:  Alert & Oriented X3, speech clear. No deficits   MSK: FROM all 4 extremities, full and equal strength  SKIN: No rashes or lesion    MEDICATIONS  (STANDING):  atorvastatin 10 milliGRAM(s) Oral at bedtime  cefuroxime   Tablet 250 milliGRAM(s) Oral every 12 hours  enoxaparin Injectable 40 milliGRAM(s) SubCutaneous daily  influenza   Vaccine 0.5 milliLiter(s) IntraMuscular once  losartan 25 milliGRAM(s) Oral daily    MEDICATIONS  (PRN):  acetaminophen   Tablet .. 650 milliGRAM(s) Oral every 6 hours PRN Mild Pain (1 - 3)  aluminum hydroxide/magnesium hydroxide/simethicone Suspension 30 milliLiter(s) Oral every 4 hours PRN Dyspepsia  melatonin 3 milliGRAM(s) Oral at bedtime PRN Insomnia  ondansetron Injectable 4 milliGRAM(s) IV Push every 6 hours PRN Nausea and/or Vomiting  predniSONE   Tablet 5 milliGRAM(s) Oral daily PRN for ra flares      LABS: All Labs Reviewed:                            11.2   11.67 )-----------( 216      ( 30 Sep 2021 06:48 )             35.1   09-30    142  |  111<H>  |  9   ----------------------------<  126<H>  3.7   |  25  |  0.81    Ca    8.0<L>      30 Sep 2021 06:48    TPro  6.2  /  Alb  2.3<L>  /  TBili  0.3  /  DBili  x   /  AST  24  /  ALT  22  /  AlkPhos  70  09-29            Blood Culture: 09-27 @ 02:01  Organism Blood Culture PCR  Gram Stain Blood -- Gram Stain   Growth in aerobic and anaerobic bottles: Gram Negative Rods  Specimen Source .Blood None  Culture-Blood --    09-27 @ 01:54  Organism --  Gram Stain Blood -- Gram Stain   Growth in aerobic and anaerobic bottles: Gram Negative Rods  Specimen Source .Blood None  Culture-Blood --    09-26 @ 23:26  Organism --  Gram Stain Blood -- Gram Stain --  Specimen Source Clean Catch None  Culture-Blood --      
Patient is an 87 year old female, PMH of COVID, HTN, HLD rheumatoid arthritis on humira and predinsone, c/o weakness and fatigue. During the interview, patient is slightly confused and AAOx2. She endorses generalized fatigue since COVID infection in early 2021, but also notes that she has residual weakness from the infx, that is more prominent in the extremities, necessitating a walker for ambulation and also causing her to have bm and urinary accidents because she cannot make it to the bathroom in time. Patient was at baseline until two days ago ambulating around her apartment, when she experienced episode of bilateral motor weakness of the lower extremities, and fell to the floor. No alleviating or aggravating factors. She did not hit her head, or notice any other antecedent or consequent trauma. Patient denies all other symptoms including ha, syncope, palpitations, sensory changes, pain or change of temperature in the lower extremities, tongue-biting or loss of control/changes in BM or urination. She lives alone and endorses having to "drag herself around all day" using her arms, until the weakness spontaneously resolved later that day. She has never symptoms like this before. When patient experienced similar episode the next day, patient's daughter encouraged her to go to hospital and she was brought in by ambulance to ED for evaluation of weakness.In the ED, patient was found to be slightly febrile at 99.6 w/ HR at 107, and elevated WBC at 23.02. Creatine kinase is 403; Urinalysis is positive for nitrites, LE, WBC, bacteria TNTC and large blood. CT abdomen showed Cystitis with bilateral ureteritis, possibly pyelonephritis. Patient was given NS 2 L bolus + dose of ceftriaxone and tylenol, and will be admitted for further workup.      SUBJECTIVE: Patient was seen and examined on rounds- No acute complaints, denies fevers, chills nausea, vomitting    REVIEW OF SYSTEMS:  All other review of systems is negative unless indicated above    Vital Signs Last 24 Hrs  T(C): 36.4 (29 Sep 2021 15:38), Max: 37.2 (29 Sep 2021 08:13)  T(F): 97.5 (29 Sep 2021 15:38), Max: 99 (29 Sep 2021 08:13)  HR: 86 (29 Sep 2021 15:38) (86 - 90)  BP: 145/68 (29 Sep 2021 15:38) (145/68 - 156/72)    RR: 18 (29 Sep 2021 15:38) (18 - 19)  SpO2: 98% (29 Sep 2021 15:38) (94% - 98%)    I&O's Summary    28 Sep 2021 07:01  -  29 Sep 2021 07:00  --------------------------------------------------------  IN: 900 mL / OUT: 0 mL / NET: 900 mL      PHYSICAL EXAM:  GENERAL: NAD, lying in bed comfortably  HEAD:  Atraumatic, Normocephalic  EYES: EOMI, PERRLA, conjunctiva and sclera clear  ENT: Moist mucous membranes  NECK: Supple, No JVD  CHEST/LUNG:  Mild crepitation bilaterally.   HEART: Regular rate and rhythm; No murmurs, rubs, or gallops  ABDOMEN: Bowel sounds present; Soft, Nontender, Nondistended.  EXTREMITIES:  2+ Peripheral Pulses, brisk capillary refill. No clubbing, cyanosis, or edema  NERVOUS SYSTEM:  Alert & Oriented X3, speech clear. No deficits   MSK: FROM all 4 extremities, full and equal strength  SKIN: No rashes or lesion    MEDICATIONS:  MEDICATIONS  (STANDING):  atorvastatin 10 milliGRAM(s) Oral at bedtime  cefTRIAXone Injectable. 2000 milliGRAM(s) IV Push every 24 hours  enoxaparin Injectable 40 milliGRAM(s) SubCutaneous daily  influenza   Vaccine 0.5 milliLiter(s) IntraMuscular once  losartan 25 milliGRAM(s) Oral daily  sodium chloride 0.9%. 1000 milliLiter(s) (75 mL/Hr) IV Continuous <Continuous>      LABS: All Labs Reviewed:                        12.3   15.85 )-----------( 179      ( 29 Sep 2021 06:33 )             37.8     09-29    139  |  108  |  13  ----------------------------<  90  3.4<L>   |  23  |  0.80    Ca    7.7<L>      29 Sep 2021 06:33    TPro  6.2  /  Alb  2.3<L>  /  TBili  0.3  /  DBili  x   /  AST  24  /  ALT  22  /  AlkPhos  70  09-29          Blood Culture: 09-27 @ 02:01  Organism Blood Culture PCR  Gram Stain Blood -- Gram Stain   Growth in aerobic and anaerobic bottles: Gram Negative Rods  Specimen Source .Blood None  Culture-Blood --    09-27 @ 01:54  Organism --  Gram Stain Blood -- Gram Stain   Growth in aerobic and anaerobic bottles: Gram Negative Rods  Specimen Source .Blood None  Culture-Blood --    09-26 @ 23:26  Organism --  Gram Stain Blood -- Gram Stain --  Specimen Source Clean Catch None  Culture-Blood --      
Patient is an 87 year old female, PMH of COVID, HTN, HLD rheumatoid arthritis on humira and predinsone, c/o weakness and fatigue. During the interview, patient is slightly confused and AAOx2. She endorses generalized fatigue since COVID infection in early 2021, but also notes that she has residual weakness from the infx, that is more prominent in the extremities, necessitating a walker for ambulation and also causing her to have bm and urinary accidents because she cannot make it to the bathroom in time. Patient was at baseline until two days ago ambulating around her apartment, when she experienced episode of bilateral motor weakness of the lower extremities, and fell to the floor. No alleviating or aggravating factors. She did not hit her head, or notice any other antecedent or consequent trauma. Patient denies all other symptoms including ha, syncope, palpitations, sensory changes, pain or change of temperature in the lower extremities, tongue-biting or loss of control/changes in BM or urination. She lives alone and endorses having to "drag herself around all day" using her arms, until the weakness spontaneously resolved later that day. She has never symptoms like this before. When patient experienced similar episode the next day, patient's daughter encouraged her to go to hospital and she was brought in by ambulance to ED for evaluation of weakness.In the ED, patient was found to be slightly febrile at 99.6 w/ HR at 107, and elevated WBC at 23.02. Creatine kinase is 403; Urinalysis is positive for nitrites, LE, WBC, bacteria TNTC and large blood. CT abdomen showed Cystitis with bilateral ureteritis, possibly pyelonephritis. Patient was given NS 2 L bolus + dose of ceftriaxone and tylenol, and will be admitted for further workup.      Subjective-  Pt was seen by his bed side, not in distress, answering the questions well. Presently, pt have no complaints.    REVIEW OF SYSTEMS:    CONSTITUTIONAL: No weakness, fevers or chills  EYES/ENT: No visual changes;  No vertigo or throat pain   NECK: No pain or stiffness  RESPIRATORY: No cough, wheezing, hemoptysis; No shortness of breath  CARDIOVASCULAR: No chest pain or palpitations  GASTROINTESTINAL: No abdominal or epigastric pain. No nausea, vomiting, or hematemesis; No diarrhea or constipation. No melena or hematochezia.  GENITOURINARY: No dysuria, frequency or hematuria  NEUROLOGICAL: No numbness or weakness  SKIN: No itching, rashes    Physical examination-     GENERAL: NAD, lying in bed comfortably  HEAD:  Atraumatic, Normocephalic  EYES: EOMI, PERRLA, conjunctiva and sclera clear  ENT: Moist mucous membranes  NECK: Supple, No JVD  CHEST/LUNG:  Mild crepitation bilaterally.   HEART: Regular rate and rhythm; No murmurs, rubs, or gallops  ABDOMEN: Bowel sounds present; Soft, Nontender, Nondistended.  EXTREMITIES:  2+ Peripheral Pulses, brisk capillary refill. No clubbing, cyanosis, or edema  NERVOUS SYSTEM:  Alert & Oriented X3, speech clear. No deficits   MSK: FROM all 4 extremities, full and equal strength  SKIN: No rashes or lesions    Vital Signs Last 24 Hrs  T(C): 36.8 (27 Sep 2021 16:20), Max: 39.2 (27 Sep 2021 04:50)  T(F): 98.3 (27 Sep 2021 16:20), Max: 102.5 (27 Sep 2021 04:50)  HR: 101 (27 Sep 2021 14:38) (96 - 108)  BP: 112/89 (27 Sep 2021 16:20) (105/67 - 152/61)  BP(mean): 81 (27 Sep 2021 13:13) (79 - 81)  RR: 18 (27 Sep 2021 16:20) (17 - 18)  SpO2: 96% (27 Sep 2021 16:20) (93% - 96%)    MEDICATIONS  (STANDING):  atorvastatin 10 milliGRAM(s) Oral at bedtime  cefTRIAXone Injectable. 1000 milliGRAM(s) IV Push every 24 hours  enoxaparin Injectable 40 milliGRAM(s) SubCutaneous daily  influenza   Vaccine 0.5 milliLiter(s) IntraMuscular once  losartan 25 milliGRAM(s) Oral daily  sodium chloride 0.9%. 1000 milliLiter(s) (75 mL/Hr) IV Continuous <Continuous>    MEDICATIONS  (PRN):  acetaminophen   Tablet .. 650 milliGRAM(s) Oral every 6 hours PRN Mild Pain (1 - 3)  aluminum hydroxide/magnesium hydroxide/simethicone Suspension 30 milliLiter(s) Oral every 4 hours PRN Dyspepsia  melatonin 3 milliGRAM(s) Oral at bedtime PRN Insomnia  ondansetron Injectable 4 milliGRAM(s) IV Push every 6 hours PRN Nausea and/or Vomiting  predniSONE   Tablet 5 milliGRAM(s) Oral daily PRN for ra flares    < from: CT Abdomen and Pelvis w/ IV Cont (09.26.21 @ 23:01) >  IMPRESSION:    Cystitis with bilateral ureteritis. Correlate clinically for pyelonephritis  No convincing focal bowel wall thickening.  Diffuse colonic diverticulosis.    < end of copied text >  < from: Xray Chest 1 View- PORTABLE-Urgent (09.26.21 @ 21:48) >  INTERPRETATION:  AP chest on September 26, 2021 at 9:39 PM. Patient has chest pain.    COMPARISON: None available.    Heart magnified by technique.    No lung or pleural finding.    IMPRESSION: No acute finding.    < end of copied text >

## 2021-10-02 NOTE — PROGRESS NOTE ADULT - ASSESSMENT
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      #Acute Metabolic Encephalopathy 2/2 acute sepsis  - Sepsis resolved  - Clinically improving- No fevers, leukocytosis downtrending  - Blood cultures on 9/27 + for E. Coli,   - Repeat cultures - No growth so far  - Cefuroxime 250 po bid   - TTE on 9/27: EF 50%, mild MR, TR  - Trend  labs, monitor vitals  - Fall precautions  - PT consulted    #Cystitis  -Improving  -Urine culture positive for E-coli  -Cefuroxime 250 po bid     #Hypokalemia  -Resolved    #HDL  - Continue with statin    #HTN  - Stable - on Losartan to 50 mg  - DASH diet  -Will continue to monitor her blood pressure    #Rheumatoid arthritis  - C/w Humira injections  - C/w PRN prednisone for acute flares    #CODE STATUS- DNR/DNI Molst completed on 9/29 with Daughter Yelitza HCP    #Dispo  - Pt will be discharged to home care after we get final blood culture report.     -Above discussed with Dr. Arshad

## 2021-10-03 ENCOUNTER — TRANSCRIPTION ENCOUNTER (OUTPATIENT)
Age: 86
End: 2021-10-03

## 2021-10-03 VITALS
HEART RATE: 92 BPM | DIASTOLIC BLOOD PRESSURE: 63 MMHG | OXYGEN SATURATION: 95 % | TEMPERATURE: 98 F | RESPIRATION RATE: 18 BRPM | SYSTOLIC BLOOD PRESSURE: 149 MMHG

## 2021-10-03 LAB
ALBUMIN SERPL ELPH-MCNC: 2.8 G/DL — LOW (ref 3.3–5)
ALP SERPL-CCNC: 57 U/L — SIGNIFICANT CHANGE UP (ref 40–120)
ALT FLD-CCNC: 29 U/L — SIGNIFICANT CHANGE UP (ref 12–78)
ANION GAP SERPL CALC-SCNC: 3 MMOL/L — LOW (ref 5–17)
AST SERPL-CCNC: 18 U/L — SIGNIFICANT CHANGE UP (ref 15–37)
BASOPHILS # BLD AUTO: 0.07 K/UL — SIGNIFICANT CHANGE UP (ref 0–0.2)
BASOPHILS NFR BLD AUTO: 0.6 % — SIGNIFICANT CHANGE UP (ref 0–2)
BILIRUB SERPL-MCNC: 0.3 MG/DL — SIGNIFICANT CHANGE UP (ref 0.2–1.2)
BUN SERPL-MCNC: 14 MG/DL — SIGNIFICANT CHANGE UP (ref 7–23)
CALCIUM SERPL-MCNC: 9 MG/DL — SIGNIFICANT CHANGE UP (ref 8.5–10.1)
CHLORIDE SERPL-SCNC: 105 MMOL/L — SIGNIFICANT CHANGE UP (ref 96–108)
CO2 SERPL-SCNC: 32 MMOL/L — HIGH (ref 22–31)
CREAT SERPL-MCNC: 1.01 MG/DL — SIGNIFICANT CHANGE UP (ref 0.5–1.3)
EOSINOPHIL # BLD AUTO: 0.89 K/UL — HIGH (ref 0–0.5)
EOSINOPHIL NFR BLD AUTO: 7.4 % — HIGH (ref 0–6)
GLUCOSE SERPL-MCNC: 106 MG/DL — HIGH (ref 70–99)
HCT VFR BLD CALC: 33.9 % — LOW (ref 34.5–45)
HGB BLD-MCNC: 11 G/DL — LOW (ref 11.5–15.5)
IMM GRANULOCYTES NFR BLD AUTO: 2.3 % — HIGH (ref 0–1.5)
LYMPHOCYTES # BLD AUTO: 3.94 K/UL — HIGH (ref 1–3.3)
LYMPHOCYTES # BLD AUTO: 32.8 % — SIGNIFICANT CHANGE UP (ref 13–44)
MCHC RBC-ENTMCNC: 28.9 PG — SIGNIFICANT CHANGE UP (ref 27–34)
MCHC RBC-ENTMCNC: 32.4 GM/DL — SIGNIFICANT CHANGE UP (ref 32–36)
MCV RBC AUTO: 89.2 FL — SIGNIFICANT CHANGE UP (ref 80–100)
MONOCYTES # BLD AUTO: 1.01 K/UL — HIGH (ref 0–0.9)
MONOCYTES NFR BLD AUTO: 8.4 % — SIGNIFICANT CHANGE UP (ref 2–14)
NEUTROPHILS # BLD AUTO: 5.81 K/UL — SIGNIFICANT CHANGE UP (ref 1.8–7.4)
NEUTROPHILS NFR BLD AUTO: 48.5 % — SIGNIFICANT CHANGE UP (ref 43–77)
PLATELET # BLD AUTO: 309 K/UL — SIGNIFICANT CHANGE UP (ref 150–400)
POTASSIUM SERPL-MCNC: 4 MMOL/L — SIGNIFICANT CHANGE UP (ref 3.5–5.3)
POTASSIUM SERPL-SCNC: 4 MMOL/L — SIGNIFICANT CHANGE UP (ref 3.5–5.3)
PROT SERPL-MCNC: 6.5 GM/DL — SIGNIFICANT CHANGE UP (ref 6–8.3)
RBC # BLD: 3.8 M/UL — SIGNIFICANT CHANGE UP (ref 3.8–5.2)
RBC # FLD: 14.4 % — SIGNIFICANT CHANGE UP (ref 10.3–14.5)
SODIUM SERPL-SCNC: 140 MMOL/L — SIGNIFICANT CHANGE UP (ref 135–145)
WBC # BLD: 12 K/UL — HIGH (ref 3.8–10.5)
WBC # FLD AUTO: 12 K/UL — HIGH (ref 3.8–10.5)

## 2021-10-03 PROCEDURE — 99239 HOSP IP/OBS DSCHRG MGMT >30: CPT | Mod: GC

## 2021-10-03 RX ORDER — CEFUROXIME AXETIL 250 MG
1 TABLET ORAL
Qty: 14 | Refills: 0
Start: 2021-10-03 | End: 2021-10-09

## 2021-10-03 RX ORDER — ATORVASTATIN CALCIUM 80 MG/1
1 TABLET, FILM COATED ORAL
Qty: 30 | Refills: 0
Start: 2021-10-03 | End: 2021-11-01

## 2021-10-03 RX ORDER — LOSARTAN POTASSIUM 100 MG/1
1 TABLET, FILM COATED ORAL
Qty: 0 | Refills: 0 | DISCHARGE

## 2021-10-03 RX ORDER — LOSARTAN POTASSIUM 100 MG/1
1 TABLET, FILM COATED ORAL
Qty: 15 | Refills: 0
Start: 2021-10-03 | End: 2021-10-17

## 2021-10-03 RX ADMIN — ENOXAPARIN SODIUM 40 MILLIGRAM(S): 100 INJECTION SUBCUTANEOUS at 09:36

## 2021-10-03 RX ADMIN — Medication 250 MILLIGRAM(S): at 05:51

## 2021-10-03 RX ADMIN — LOSARTAN POTASSIUM 50 MILLIGRAM(S): 100 TABLET, FILM COATED ORAL at 09:36

## 2021-10-03 NOTE — CHART NOTE - NSCHARTNOTEFT_GEN_A_CORE
Pt seen and examined with house staff.  Plan formulated and reviewed on rounds    Briefly,  86 y/o female with HTN, RA on immunosuppressive and HL admitted with UTI sepsis-- E. coli bacteremia.  On CTX.  Repeat BCx NTD  Sitting in chair. Eager to go home  ROS o/w negative   No events overnight     NAD  Stable vitals  Slightly hypertensive  No fevers    Grossly non focal     Labs reviewed    Repeat BCx Negative    UTI sepsis with E. coli and bacteremia POA    DC home today and complete course of Ceftin

## 2021-10-03 NOTE — DISCHARGE NOTE PROVIDER - NSDCCPCAREPLAN_GEN_ALL_CORE_FT
PRINCIPAL DISCHARGE DIAGNOSIS  Diagnosis: E coli bacteremia  Assessment and Plan of Treatment: You were admitted for an E. Coli infection found in your blood. You were treated with IV antibiotics. Your repeat blood cultures were negative. You will continue oral antibiotics ceftin 250 mg twice daily for another 7 days. Recommend follow up with PCP Dr. Komal Durand.      SECONDARY DISCHARGE DIAGNOSES  Diagnosis: Sepsis due to Escherichia coli  Assessment and Plan of Treatment: You were found to have this condition. You were treated. Sepsis is not resolved. Recommend follow up with PCP    Diagnosis: E-coli UTI  Assessment and Plan of Treatment: You likely had an urinary tract infection which was is being treated with antibiotics. Recommend follow up with PCP     PRINCIPAL DISCHARGE DIAGNOSIS  Diagnosis: E coli bacteremia  Assessment and Plan of Treatment: You were admitted for an E. Coli infection found in your blood. You were treated with IV antibiotics. Your repeat blood cultures were negative. You will continue oral antibiotics ceftin 250 mg twice daily for another 7 days. Recommend follow up with PCP Dr. Komal Durand.      SECONDARY DISCHARGE DIAGNOSES  Diagnosis: Sepsis due to Escherichia coli  Assessment and Plan of Treatment: You were found to have this condition. You were treated. Sepsis is not resolved. Recommend follow up with PCP    Diagnosis: E-coli UTI  Assessment and Plan of Treatment: You likely had an urinary tract infection which was is being treated with antibiotics. Recommend follow up with PCP    Diagnosis: Hypertension  Assessment and Plan of Treatment: Your blood pressure medication dose was increased to better control your blood pressure. Recommend continuing Losartan 50 mg QD. Follow up with PCP

## 2021-10-03 NOTE — DISCHARGE NOTE PROVIDER - HOSPITAL COURSE
87F w/PMH COVID, HTN, HDL rheumatoid arthritis presented to [facility] on 9/21 for weakness and altered mental status, found to septic secondary to uti for which she was admitted. Patient received IV fluids, blood and urine cultures were collected, and started on empiric IV antibiotics Rocephin. ID was consulted. Blood cultures growing E.Coli. Repeat blood cultures negative. Antibiotics were switched over to oral antibiotics on 9/30 ceftin 250 mg BID- to complete 11 days total course, will need another 7 days. Hospital course otherwise uncomplicated. Patient to resume all other home medications. Recommend follow up with PCP Dr. Durand.    SUBJECTIVE: Patient seen and examined this AM. Doing well. No acute complaints. Agrees to above plan.     REVIEW OF SYSTEMS:  All other review of systems is negative unless indicated above    Vital Signs Last 24 Hrs  T(C): 36.8 (03 Oct 2021 07:44), Max: 36.9 (02 Oct 2021 16:24)  T(F): 98.3 (03 Oct 2021 07:44), Max: 98.5 (02 Oct 2021 16:24)  HR: 92 (03 Oct 2021 07:44) (84 - 92)  BP: 149/63 (03 Oct 2021 07:44) (130/57 - 149/63)  RR: 18 (03 Oct 2021 07:44) (18 - 18)  SpO2: 95% (03 Oct 2021 07:44) (93% - 96%)      PHYSICAL EXAM:  Constitutional: NAD, awake and alert, well-developed  HEENT: PERR, EOMI, Normal Hearing, MMM  Neck: Soft and supple, No LAD, No JVD  Respiratory: Breath sounds are clear bilaterally, No wheezing, rales or rhonchi  Cardiovascular: S1 and S2, regular rate and rhythm, no Murmurs, gallops or rubs  Gastrointestinal: Bowel Sounds present, soft, nontender, nondistended, no guarding, no rebound  Extremities: No peripheral edema  Vascular: 2+ peripheral pulses  Neurological: A/O x 3, no focal deficits      MEDICATIONS:  MEDICATIONS  (STANDING):  atorvastatin 10 milliGRAM(s) Oral at bedtime  cefuroxime   Tablet 250 milliGRAM(s) Oral every 12 hours  enoxaparin Injectable 40 milliGRAM(s) SubCutaneous daily  influenza   Vaccine 0.5 milliLiter(s) IntraMuscular once  losartan 50 milliGRAM(s) Oral daily      LABS: All Labs Reviewed:                        11.0   12.00 )-----------( 309      ( 03 Oct 2021 07:09 )             33.9     10-03    140  |  105  |  14  ----------------------------<  106<H>  4.0   |  32<H>  |  1.01    Ca    9.0      03 Oct 2021 07:09    TPro  6.5  /  Alb  2.8<L>  /  TBili  0.3  /  DBili  x   /  AST  18  /  ALT  29  /  AlkPhos  57  10-03          Blood Culture: 09-29 @ 11:26  Organism --  Gram Stain Blood -- Gram Stain --  Specimen Source .Blood None  Culture-Blood --    09-29 @ 11:18  Organism --  Gram Stain Blood -- Gram Stain --  Specimen Source .Blood None  Culture-Blood --    09-29 @ 06:33  Organism --  Gram Stain Blood -- Gram Stain --  Specimen Source .Blood None  Culture-Blood --

## 2021-10-03 NOTE — DISCHARGE NOTE PROVIDER - CARE PROVIDER_API CALL
Komal Durand ([place])  [place] [address]  Phone: [phone]  Fax: [phone]  Established Patient  Follow Up Time: 1 week

## 2021-10-04 ENCOUNTER — NON-APPOINTMENT (OUTPATIENT)
Age: 86
End: 2021-10-04

## 2021-10-04 PROBLEM — I10 ESSENTIAL (PRIMARY) HYPERTENSION: Chronic | Status: ACTIVE | Noted: 2021-09-27

## 2021-10-04 PROBLEM — E78.00 PURE HYPERCHOLESTEROLEMIA, UNSPECIFIED: Chronic | Status: ACTIVE | Noted: 2021-09-27

## 2021-10-04 PROBLEM — M06.9 RHEUMATOID ARTHRITIS, UNSPECIFIED: Chronic | Status: ACTIVE | Noted: 2021-09-27

## 2021-10-04 PROBLEM — M19.90 UNSPECIFIED OSTEOARTHRITIS, UNSPECIFIED SITE: Chronic | Status: ACTIVE | Noted: 2021-09-27

## 2021-10-04 PROBLEM — U07.1 COVID-19: Chronic | Status: ACTIVE | Noted: 2021-09-27

## 2021-10-04 LAB
CULTURE RESULTS: SIGNIFICANT CHANGE UP
SPECIMEN SOURCE: SIGNIFICANT CHANGE UP

## 2021-10-06 PROBLEM — U07.1 PNEUMONIA DUE TO COVID-19 VIRUS: Status: ACTIVE | Noted: 2021-03-09

## 2021-10-07 DIAGNOSIS — E78.5 HYPERLIPIDEMIA, UNSPECIFIED: ICD-10-CM

## 2021-10-07 DIAGNOSIS — R73.9 HYPERGLYCEMIA, UNSPECIFIED: ICD-10-CM

## 2021-10-07 DIAGNOSIS — I10 ESSENTIAL (PRIMARY) HYPERTENSION: ICD-10-CM

## 2021-10-07 DIAGNOSIS — N30.90 CYSTITIS, UNSPECIFIED WITHOUT HEMATURIA: ICD-10-CM

## 2021-10-07 DIAGNOSIS — Z60.2 PROBLEMS RELATED TO LIVING ALONE: ICD-10-CM

## 2021-10-07 DIAGNOSIS — I08.1 RHEUMATIC DISORDERS OF BOTH MITRAL AND TRICUSPID VALVES: ICD-10-CM

## 2021-10-07 DIAGNOSIS — M06.9 RHEUMATOID ARTHRITIS, UNSPECIFIED: ICD-10-CM

## 2021-10-07 DIAGNOSIS — Z79.899 OTHER LONG TERM (CURRENT) DRUG THERAPY: ICD-10-CM

## 2021-10-07 DIAGNOSIS — Z20.822 CONTACT WITH AND (SUSPECTED) EXPOSURE TO COVID-19: ICD-10-CM

## 2021-10-07 DIAGNOSIS — U09.9 POST COVID-19 CONDITION, UNSPECIFIED: ICD-10-CM

## 2021-10-07 DIAGNOSIS — M19.90 UNSPECIFIED OSTEOARTHRITIS, UNSPECIFIED SITE: ICD-10-CM

## 2021-10-07 DIAGNOSIS — A41.51 SEPSIS DUE TO ESCHERICHIA COLI [E. COLI]: ICD-10-CM

## 2021-10-07 DIAGNOSIS — E78.00 PURE HYPERCHOLESTEROLEMIA, UNSPECIFIED: ICD-10-CM

## 2021-10-07 DIAGNOSIS — N28.89 OTHER SPECIFIED DISORDERS OF KIDNEY AND URETER: ICD-10-CM

## 2021-10-07 DIAGNOSIS — E87.6 HYPOKALEMIA: ICD-10-CM

## 2021-10-07 DIAGNOSIS — G93.41 METABOLIC ENCEPHALOPATHY: ICD-10-CM

## 2021-10-07 DIAGNOSIS — R29.898 OTHER SYMPTOMS AND SIGNS INVOLVING THE MUSCULOSKELETAL SYSTEM: ICD-10-CM

## 2021-10-07 DIAGNOSIS — Z66 DO NOT RESUSCITATE: ICD-10-CM

## 2021-10-07 SDOH — SOCIAL STABILITY - SOCIAL INSECURITY: PROBLEMS RELATED TO LIVING ALONE: Z60.2

## 2021-10-11 ENCOUNTER — APPOINTMENT (OUTPATIENT)
Dept: FAMILY MEDICINE | Facility: CLINIC | Age: 86
End: 2021-10-11
Payer: MEDICARE

## 2021-10-11 VITALS
WEIGHT: 133 LBS | SYSTOLIC BLOOD PRESSURE: 128 MMHG | HEIGHT: 60.5 IN | BODY MASS INDEX: 25.44 KG/M2 | HEART RATE: 91 BPM | DIASTOLIC BLOOD PRESSURE: 74 MMHG | TEMPERATURE: 97.2 F | OXYGEN SATURATION: 95 %

## 2021-10-11 DIAGNOSIS — R53.1 WEAKNESS: ICD-10-CM

## 2021-10-11 DIAGNOSIS — Z86.19 PERSONAL HISTORY OF OTHER INFECTIOUS AND PARASITIC DISEASES: ICD-10-CM

## 2021-10-11 DIAGNOSIS — Z23 ENCOUNTER FOR IMMUNIZATION: ICD-10-CM

## 2021-10-11 DIAGNOSIS — Z09 ENCOUNTER FOR FOLLOW-UP EXAMINATION AFTER COMPLETED TREATMENT FOR CONDITIONS OTHER THAN MALIGNANT NEOPLASM: ICD-10-CM

## 2021-10-11 DIAGNOSIS — N39.0 URINARY TRACT INFECTION, SITE NOT SPECIFIED: ICD-10-CM

## 2021-10-11 PROCEDURE — 90662 IIV NO PRSV INCREASED AG IM: CPT

## 2021-10-11 PROCEDURE — G0008: CPT

## 2021-10-11 PROCEDURE — 99495 TRANSJ CARE MGMT MOD F2F 14D: CPT | Mod: 25

## 2021-10-11 RX ORDER — ADALIMUMAB 40MG/0.8ML
40 KIT SUBCUTANEOUS
Qty: 6 | Refills: 0 | Status: COMPLETED | COMMUNITY
Start: 2021-06-03 | End: 2021-10-11

## 2021-10-11 RX ORDER — CEFUROXIME AXETIL 250 MG/1
250 TABLET ORAL
Qty: 14 | Refills: 0 | Status: COMPLETED | COMMUNITY
Start: 2021-10-03

## 2021-10-11 RX ORDER — PREDNISONE 5 MG/1
5 TABLET ORAL
Qty: 60 | Refills: 0 | Status: COMPLETED | COMMUNITY
Start: 2021-04-26

## 2021-10-11 NOTE — PLAN
[FreeTextEntry1] : Labs reviewed, final blood cx negative.\par Recent labs unremarkable. Cont current meds\par Cont increased dose of losartan 50mg\par Cont PT at home\par Pt asymptomatic and feeling better. Pt at baseline and walking with walker. f/u if symptoms return \par \par Flu shot administered. Pt tolerated well\par

## 2021-10-11 NOTE — HISTORY OF PRESENT ILLNESS
[Post-hospitalization from ___ Hospital] : Post-hospitalization from [unfilled] Hospital [Admitted on: ___] : The patient was admitted on [unfilled] [Discharged on ___] : discharged on [unfilled] [Discharge Summary] : discharge summary [Patient Contacted By: ____] : and contacted by [unfilled] [FreeTextEntry2] : Patient states she slid out of bed one morning and landed on floor. Was sent by EMS to , admitted for weakness and AMS secondary to sepsis from UTI.  Patient received IV fluids, blood and urine cultures were collected, and started on IV antibiotics Rocephin. ID was consulted. Repeat blood cultures negative. Antibiotics were switched over to oral antibiotics on 9/30 Ceftin 250 mg BID which was completed today. She feels much better with no symptoms. Is now living with her daughter.\par Has visiting RN- vitals have been stable. Losartan increased to 50mg when in hospital. BP has been good at home\par Working with PT at home and feeling much better. Has strength back.

## 2021-10-11 NOTE — REVIEW OF SYSTEMS
[Fever] : no fever [Chills] : no chills [Fatigue] : no fatigue [Chest Pain] : no chest pain [Palpitations] : no palpitations [Shortness Of Breath] : no shortness of breath [Wheezing] : no wheezing [Cough] : no cough [Abdominal Pain] : no abdominal pain [Nausea] : no nausea [Diarrhea] : diarrhea [Vomiting] : no vomiting [Dysuria] : no dysuria [Hematuria] : no hematuria [Headache] : no headache [Dizziness] : no dizziness

## 2021-10-11 NOTE — PHYSICAL EXAM
[No Acute Distress] : no acute distress [Well-Appearing] : well-appearing [Normal Sclera/Conjunctiva] : normal sclera/conjunctiva [Normal Outer Ear/Nose] : the outer ears and nose were normal in appearance [Normal TMs] : both tympanic membranes were normal [No Respiratory Distress] : no respiratory distress  [No Accessory Muscle Use] : no accessory muscle use [Clear to Auscultation] : lungs were clear to auscultation bilaterally [Normal Rate] : normal rate  [Regular Rhythm] : with a regular rhythm [Soft] : abdomen soft [Non Tender] : non-tender [Non-distended] : non-distended [Normal Bowel Sounds] : normal bowel sounds [No Rash] : no rash [No Focal Deficits] : no focal deficits [Normal Affect] : the affect was normal [Normal Insight/Judgement] : insight and judgment were intact

## 2021-10-31 ENCOUNTER — EMERGENCY (EMERGENCY)
Facility: HOSPITAL | Age: 86
LOS: 0 days | Discharge: HOME | End: 2021-10-31
Attending: STUDENT IN AN ORGANIZED HEALTH CARE EDUCATION/TRAINING PROGRAM
Payer: MEDICARE

## 2021-10-31 VITALS
HEART RATE: 92 BPM | TEMPERATURE: 97 F | HEIGHT: 65 IN | RESPIRATION RATE: 18 BRPM | SYSTOLIC BLOOD PRESSURE: 158 MMHG | OXYGEN SATURATION: 97 % | WEIGHT: 132.06 LBS | DIASTOLIC BLOOD PRESSURE: 99 MMHG

## 2021-10-31 DIAGNOSIS — I10 ESSENTIAL (PRIMARY) HYPERTENSION: ICD-10-CM

## 2021-10-31 DIAGNOSIS — R21 RASH AND OTHER NONSPECIFIC SKIN ERUPTION: ICD-10-CM

## 2021-10-31 DIAGNOSIS — E78.5 HYPERLIPIDEMIA, UNSPECIFIED: ICD-10-CM

## 2021-10-31 DIAGNOSIS — Z88.2 ALLERGY STATUS TO SULFONAMIDES: ICD-10-CM

## 2021-10-31 DIAGNOSIS — L25.9 UNSPECIFIED CONTACT DERMATITIS, UNSPECIFIED CAUSE: ICD-10-CM

## 2021-10-31 PROCEDURE — 99283 EMERGENCY DEPT VISIT LOW MDM: CPT

## 2021-10-31 PROCEDURE — 99282 EMERGENCY DEPT VISIT SF MDM: CPT

## 2021-10-31 NOTE — ED STATDOCS - OBJECTIVE STATEMENT
87 F PMH HTN and HLD, here with itchy rash since yesterday. Went to urgent care was given antibiotic, steroid and antifungal cream. No new lotions, soaps, detergents. but patient just moved in with her daughter. No fevers, chills, nausea, vomiting.

## 2021-10-31 NOTE — ED STATDOCS - CARE PROVIDER_API CALL
Kwame Tay)  Dermatology  [address] [place] [address]  Phone: [phone]  Fax: [phone]  Follow Up Time:    Kwame Tay)  Dermatology  [address] [place] [address]  Phone: [phone]  Fax: [phone]  Follow Up Time:     Vega Nichols  DERMATOLOGY  [address] [facility], [address]  [place] [address]  Phone: [phone]  Fax: [phone]  Follow Up Time:

## 2021-10-31 NOTE — ED ADULT NURSE NOTE - CHIEF COMPLAINT QUOTE
Patient states she developed a pruritic rash on her trunk early Saturday morning.  she was seen at urgent care and given a cream.  rash has now extended to bilateral FA and lower extremtietes.  she states cream is not helping and rash is very pruritic.  She denies any new products.  no known allergies.  patient denies nausea, speaking in full sentences.    daughter sade conley

## 2021-10-31 NOTE — ED STATDOCS - CARE PROVIDERS DIRECT ADDRESSES
,[email or web address],DirectAddress_Unknown

## 2021-10-31 NOTE — ED STATDOCS - PATIENT PORTAL LINK FT
You can access the FollowMyHealth Patient Portal offered by [facility] by registering at the following website: [email or web address]. By joining [employer]’s FollowMyHealth portal, you will also be able to view your health information using other applications (apps) compatible with our system.

## 2021-10-31 NOTE — ED STATDOCS - PROVIDER TOKENS
PROVIDER:[TOKEN:[[id number]]] PROVIDER:[TOKEN:[[id number]]],PROVIDER:[TOKEN:[[id number]]]

## 2021-10-31 NOTE — ED ADULT TRIAGE NOTE - CHIEF COMPLAINT QUOTE
Patient states she developed a pruritic rash on her trunk early Saturday morning.  she was seen at urgent care and given a cream.  rash has now extended to bilateral FA and lower extremtietes.  she states cream is not helping and rash is very pruritic.  She denies any new products.  no known allergies. Patient states she developed a pruritic rash on her trunk early Saturday morning.  she was seen at urgent care and given a cream.  rash has now extended to bilateral FA and lower extremtietes.  she states cream is not helping and rash is very pruritic.  She denies any new products.  no known allergies.  patient denies nausea, speaking in full sentences.    daughter sade conley

## 2021-10-31 NOTE — ED STATDOCS - ATTENDING CONTRIBUTION TO CARE
I, Steven Estrella DO, personally saw the patient with ACP.  I have personally performed a face to face diagnostic evaluation on this patient and formulated the patient plan. The case was discussed with, and handed off to ACP who followed the case through to the re-evaluation and disposition.

## 2021-10-31 NOTE — ED STATDOCS - PROGRESS NOTE DETAILS
Pt. is an 87 year old female presenting with body rash. Pt. states extremely itchy.  Pt. was evaluated at urgent care yesterday and placed on prednisone 40mg, antifungal cream and an antibiotic.  No dermatologist to follow with.  Recently moved to her daughter's house.  Naomi Moody PA-C

## 2021-10-31 NOTE — ED STATDOCS - NSFOLLOWUPINSTRUCTIONS_ED_ALL_ED_FT
Contact Dermatitis    AMBULATORY CARE:    Contact dermatitis is a rash. It develops when you touch something that irritates your skin or causes an allergic reaction.    Common signs and symptoms include the following:   •Red, swollen, painful rash      •Skin that itches, stings, or burns      •Dry, scaly, or crusty skin patches      •Bumps or blisters      •Fluid draining from blisters      Call your local emergency number (911 in the [place]) if:   •You have sudden trouble breathing.      •Your throat swells and you have trouble eating.      •Your face is swollen.      Call your doctor or dermatologist if:   •You have a fever.      •Your blisters are draining pus.      •Your rash spreads or does not get better, even after treatment.      •You have questions or concerns about your condition or care.      Treatment for contact dermatitis involves removing any irritants or allergens that cause your rash. You may also need medicines to decrease itching and swelling. They will be given as a topical medicine to apply to your rash or as a pill.    Manage contact dermatitis:   •Take short baths or showers in cool water. Use mild soap or soap-free cleansers. Add oatmeal, baking soda, or cornstarch to the bath water to help decrease skin irritation.      •Avoid skin irritants. Examples include makeup, hair products, soaps, and cleansers. Use products that do not contain a scent or dye.      •Apply a cool compress to your rash. This will help soothe your skin.      •Apply lotions or creams to the area. These help keep your skin moist and decrease itching. Apply the lotion or cream right after a lukewarm bath or shower when your skin is still damp. Use products that do not contain a scent.      Follow up with your doctor or dermatologist in 2 to 3 days: Write down your questions so you remember to ask them during your visits.

## 2021-11-01 ENCOUNTER — TRANSCRIPTION ENCOUNTER (OUTPATIENT)
Age: 86
End: 2021-11-01

## 2021-11-02 ENCOUNTER — APPOINTMENT (OUTPATIENT)
Dept: DERMATOLOGY | Facility: CLINIC | Age: 86
End: 2021-11-02
Payer: MEDICARE

## 2021-11-02 DIAGNOSIS — R21 RASH AND OTHER NONSPECIFIC SKIN ERUPTION: ICD-10-CM

## 2021-11-02 PROCEDURE — 99204 OFFICE O/P NEW MOD 45 MIN: CPT

## 2021-11-02 RX ORDER — CLOTRIMAZOLE AND BETAMETHASONE DIPROPIONATE 10; .5 MG/G; MG/G
CREAM TOPICAL
Refills: 0 | Status: ACTIVE | COMMUNITY

## 2021-11-02 NOTE — PHYSICAL EXAM
[FreeTextEntry3] : Type II skin\par \par No rash present\par \par Stroke test:  2+ flare\par  No wheal\par No pruritus

## 2021-11-02 NOTE — ASSESSMENT
[FreeTextEntry1] : Episodic pruritic rash of uncertain etiology-history is more consistent with an urticarial type rash than contact dermatitis\par ? Component of dermatographism

## 2021-11-02 NOTE — HISTORY OF PRESENT ILLNESS
[FreeTextEntry1] : Itchy rash [de-identified] : First visit for 87-year-old white female with a 10 day history of episodic pruritic rash that starts at the feet and slowly moved up her body no higher than the shoulders.  Lasts about one hour and spontaneously resolves.\par Seen on October 31, 2021, at the [facility] emergency room. Diagnosed with "contact dermatitis".\par Placed on prednisone 5 mg p.o. twice a day, Lotrisone cream, and a course of cefuroxime.\par Rash is improving.  \par Patient took Benadryl x1\par \par Note: Patient has had no outbreaks today.  No previous episodes.\par Patient has recently started using Phisoderm anti-blemish body wash prior to onset of the rash. Contain salicylic acid as well as multiple other agents.\par Past Using this in the past 2 days.\par \par Note: Patient is on Humira 40 mg subcutaneous every 2 weeks for rheumatoid arthritis\par \par

## 2022-01-21 DIAGNOSIS — R26.89 OTHER ABNORMALITIES OF GAIT AND MOBILITY: ICD-10-CM

## 2022-01-21 NOTE — ED PROVIDER NOTE - TEMPLATE, MLM
General Birth Control Pills Pregnancy And Lactation Text: This medication should be avoided if pregnant and for the first 30 days post-partum.

## 2022-02-01 ENCOUNTER — APPOINTMENT (OUTPATIENT)
Dept: FAMILY MEDICINE | Facility: CLINIC | Age: 87
End: 2022-02-01
Payer: MEDICARE

## 2022-02-01 PROCEDURE — 99442: CPT

## 2022-02-01 NOTE — PLAN
[FreeTextEntry1] : Suspected UTI\par - will treat empircally with abx\par - discussed importance of urine cx. Pt understands but cannot leave sample\par - advised she must come to office if worsening sx despite abx use

## 2022-02-01 NOTE — HISTORY OF PRESENT ILLNESS
[Home] : at home, [unfilled] , at the time of the visit. [Medical Office: ([facility])___] : at the medical office located in  [Verbal consent obtained from patient] : the patient, [unfilled] [FreeTextEntry8] : 87 year old female presents with increased urinary frequency and burning. States this was how she felt prior to her hospitliztion with sepsis secondary to untreated UTI. No fever. Some back pain when she lays down but states that is chronic and may be due to her arthritis. States she is incontinent and cannot leave a urine sample, wears depends

## 2022-02-08 ENCOUNTER — RX RENEWAL (OUTPATIENT)
Age: 87
End: 2022-02-08

## 2022-03-15 ENCOUNTER — RX RENEWAL (OUTPATIENT)
Age: 87
End: 2022-03-15

## 2022-04-29 ENCOUNTER — RX RENEWAL (OUTPATIENT)
Age: 87
End: 2022-04-29

## 2022-04-29 RX ORDER — TRAMADOL HYDROCHLORIDE 50 MG/1
50 TABLET, COATED ORAL
Refills: 0 | Status: ACTIVE | COMMUNITY
Start: 2022-04-29

## 2022-05-21 NOTE — PLAN
[FreeTextEntry1] : Pain likely MSK/arthritis\par - cont tylenol PRN\par - counseled on symptoms and location of gallstones and advised her symptoms are not consistent with this\par - cont f/u with rheum\par - pain meds for severe pain but would advise to hold off if pain improving  Admission

## 2022-06-23 ENCOUNTER — APPOINTMENT (OUTPATIENT)
Dept: FAMILY MEDICINE | Facility: CLINIC | Age: 87
End: 2022-06-23
Payer: MEDICARE

## 2022-06-23 VITALS
WEIGHT: 134 LBS | BODY MASS INDEX: 25.63 KG/M2 | HEART RATE: 132 BPM | HEIGHT: 60.5 IN | RESPIRATION RATE: 16 BRPM | TEMPERATURE: 97.3 F | DIASTOLIC BLOOD PRESSURE: 90 MMHG | SYSTOLIC BLOOD PRESSURE: 146 MMHG | OXYGEN SATURATION: 97 %

## 2022-06-23 PROCEDURE — 99213 OFFICE O/P EST LOW 20 MIN: CPT

## 2022-06-23 NOTE — HISTORY OF PRESENT ILLNESS
[FreeTextEntry8] : 87 year old female presents for f/u for contact dermatitis. She states she has itching but no rash. She was given a cream that helps but itching still wakes her up at night.

## 2022-06-23 NOTE — PLAN
[FreeTextEntry1] : Pruritus\par - trial of hydroxyzine at bed time. Advised not to combine with benadryl or similar meds\par - f/u if no improvement

## 2022-06-23 NOTE — PHYSICAL EXAM
[No Acute Distress] : no acute distress [Well-Appearing] : well-appearing [Normal Sclera/Conjunctiva] : normal sclera/conjunctiva [Normal Outer Ear/Nose] : the outer ears and nose were normal in appearance [No Respiratory Distress] : no respiratory distress  [No Accessory Muscle Use] : no accessory muscle use [Clear to Auscultation] : lungs were clear to auscultation bilaterally [Normal Rate] : normal rate  [Regular Rhythm] : with a regular rhythm [No Rash] : no rash

## 2022-07-18 ENCOUNTER — APPOINTMENT (OUTPATIENT)
Dept: OPHTHALMOLOGY | Facility: CLINIC | Age: 87
End: 2022-07-18

## 2022-07-18 ENCOUNTER — NON-APPOINTMENT (OUTPATIENT)
Age: 87
End: 2022-07-18

## 2022-07-18 PROCEDURE — 92134 CPTRZ OPH DX IMG PST SGM RTA: CPT

## 2022-07-18 PROCEDURE — 92014 COMPRE OPH EXAM EST PT 1/>: CPT

## 2022-07-18 PROCEDURE — 92015 DETERMINE REFRACTIVE STATE: CPT

## 2022-08-29 ENCOUNTER — NON-APPOINTMENT (OUTPATIENT)
Age: 87
End: 2022-08-29

## 2022-08-29 ENCOUNTER — APPOINTMENT (OUTPATIENT)
Dept: OPHTHALMOLOGY | Facility: CLINIC | Age: 87
End: 2022-08-29

## 2022-08-29 PROCEDURE — 99214 OFFICE O/P EST MOD 30 MIN: CPT

## 2022-09-02 NOTE — PROGRESS NOTE ADULT - PROVIDER SPECIALTY LIST ADULT
[facility] SUMMARY    Name:  Josetta Peabody  MR#:   [id number]  :  1944  ACCOUNT #:  [de-identified]  ADMIT DATE:  2022  DISCHARGE DATE:  2022    ADDENDUM TO AURE #[id number] - DL 2022 11:15 AM    The patient is going to [facility] for skilled nursing rehab. Changes in her clinical course since the discharge summary was done yesterday, include changing off digoxin and starting amiodarone per Cardiology. The patient is 66years old. She has been admitted for AFib with associated palpitations and dyspnea on exertion. She had fluctuating ventricular response, thus requiring various medication adjustments. She is currently stable now on amiodarone 400 mg twice daily for 5 days and then she will transition on  to amiodarone 200 mg daily. Neurontin has been discontinued. She had a good bowel movement last night and there have been no clinical changes otherwise. Today, on exam, the patient is awake, alert, working on her iPad playing [employer]. Her  is at the bedside. They have no concerns. She is 100% saturated on room air. Her respiratory rate is 16. Her blood pressure is 108/60, pulse 65, temperature 98.2. Cardiac exam, regular rhythm, regular rate. Lungs are clear bilaterally. Abdomen is soft and nontender with normal bowel sounds. There is no distal edema of the legs. She will discharge to skilled nursing. She has a do not resuscitate code status. The new meds include amiodarone 400 mg twice daily for four more days, amiodarone 200 mg daily starting on , otherwise continue her calcium with vitamin D, multivitamin, Pepcid, fish oil, gabapentin, Paxil, MiraLax, vitamin C and vitamin D that were listed on her discharge summary already. Total time on discharge 1 hour and transition to [facility] today.   The patient is stable for release from the hospital.      MD LAMBERTO [email or web address]  D:
Family Medicine
Infectious Disease
09/02/2022 10:35  T:  09/02/2022 11:10  JOB #:  [id number]  CC:   Mariano Perez MD
Family Medicine
Infectious Disease

## 2022-09-17 ENCOUNTER — RX RENEWAL (OUTPATIENT)
Age: 87
End: 2022-09-17

## 2022-09-19 ENCOUNTER — RX RENEWAL (OUTPATIENT)
Age: 87
End: 2022-09-19

## 2022-10-11 ENCOUNTER — RX RENEWAL (OUTPATIENT)
Age: 87
End: 2022-10-11

## 2022-10-17 ENCOUNTER — APPOINTMENT (OUTPATIENT)
Dept: FAMILY MEDICINE | Facility: CLINIC | Age: 87
End: 2022-10-17

## 2022-10-17 DIAGNOSIS — U07.1 COVID-19: ICD-10-CM

## 2022-10-17 PROCEDURE — 99442: CPT

## 2022-10-17 RX ORDER — CEFUROXIME AXETIL 250 MG/1
250 TABLET ORAL
Qty: 14 | Refills: 0 | Status: DISCONTINUED | COMMUNITY
End: 2022-10-17

## 2022-10-27 ENCOUNTER — APPOINTMENT (OUTPATIENT)
Dept: FAMILY MEDICINE | Facility: CLINIC | Age: 87
End: 2022-10-27

## 2022-11-01 ENCOUNTER — APPOINTMENT (OUTPATIENT)
Dept: FAMILY MEDICINE | Facility: CLINIC | Age: 87
End: 2022-11-01

## 2022-11-01 ENCOUNTER — NON-APPOINTMENT (OUTPATIENT)
Age: 87
End: 2022-11-01

## 2022-11-01 VITALS
DIASTOLIC BLOOD PRESSURE: 90 MMHG | BODY MASS INDEX: 25.05 KG/M2 | OXYGEN SATURATION: 96 % | TEMPERATURE: 97.2 F | HEART RATE: 99 BPM | WEIGHT: 131 LBS | SYSTOLIC BLOOD PRESSURE: 140 MMHG | HEIGHT: 60.5 IN | RESPIRATION RATE: 16 BRPM

## 2022-11-01 DIAGNOSIS — Z86.16 PERSONAL HISTORY OF COVID-19: ICD-10-CM

## 2022-11-01 DIAGNOSIS — L29.9 PRURITUS, UNSPECIFIED: ICD-10-CM

## 2022-11-01 DIAGNOSIS — I45.10 UNSPECIFIED RIGHT BUNDLE-BRANCH BLOCK: ICD-10-CM

## 2022-11-01 DIAGNOSIS — R39.89 OTHER SYMPTOMS AND SIGNS INVOLVING THE GENITOURINARY SYSTEM: ICD-10-CM

## 2022-11-01 LAB
BILIRUB UR QL STRIP: NEGATIVE
GLUCOSE UR-MCNC: NEGATIVE
HCG UR QL: 0.2 EU/DL
HGB UR QL STRIP.AUTO: NORMAL
KETONES UR-MCNC: NEGATIVE
LEUKOCYTE ESTERASE UR QL STRIP: NORMAL
NITRITE UR QL STRIP: POSITIVE
PH UR STRIP: 6
PROT UR STRIP-MCNC: NEGATIVE
SP GR UR STRIP: 1.02

## 2022-11-01 PROCEDURE — 93000 ELECTROCARDIOGRAM COMPLETE: CPT

## 2022-11-01 PROCEDURE — G0439: CPT

## 2022-11-01 PROCEDURE — 81003 URINALYSIS AUTO W/O SCOPE: CPT | Mod: QW

## 2022-11-01 PROCEDURE — 99214 OFFICE O/P EST MOD 30 MIN: CPT | Mod: 25

## 2022-11-01 RX ORDER — LOSARTAN POTASSIUM 25 MG/1
25 TABLET, FILM COATED ORAL
Qty: 90 | Refills: 0 | Status: DISCONTINUED | COMMUNITY
Start: 2020-11-19 | End: 2022-11-01

## 2022-11-01 RX ORDER — AMOXICILLIN 500 MG/1
500 TABLET, FILM COATED ORAL
Qty: 22 | Refills: 0 | Status: DISCONTINUED | COMMUNITY
Start: 2022-06-13

## 2022-11-01 RX ORDER — NIRMATRELVIR AND RITONAVIR 300-100 MG
20 X 150 MG & KIT ORAL
Qty: 1 | Refills: 0 | Status: DISCONTINUED | COMMUNITY
Start: 2022-10-17 | End: 2022-11-01

## 2022-11-01 NOTE — PLAN
[FreeTextEntry1] : Blood work done\par Medications refilled for HTN and hyperlipidemia.\par \par To see cardiology.

## 2022-11-01 NOTE — HEALTH RISK ASSESSMENT
[Good] : ~his/her~  mood as  good [Never] : Never [No falls in past year] : Patient reported no falls in the past year [de-identified] : walking [de-identified] : regular diet [Transportation] : transportation [With Family] : lives with family [Fully functional (bathing, dressing, toileting, transferring, walking, feeding)] : Fully functional (bathing, dressing, toileting, transferring, walking, feeding) [Independent] : using telephone [Some assistance needed] : managing finances [Full assistance needed] : using transportation [Reports changes in hearing] : Reports no changes in hearing [Reports changes in vision] : Reports no changes in vision [Reports changes in dental health] : Reports no changes in dental health [Smoke Detector] : smoke detector [Seat Belt] :  uses seat belt [de-identified] : in daughter's home [de-identified] : wears hearing aids [de-identified] : glasses regular eye exams

## 2022-11-01 NOTE — DATA REVIEWED
[FreeTextEntry1] : EKG RBBB change this EKG to also left axis =bifascicular block. Has not seen cardiology, recent COVID

## 2022-11-01 NOTE — PHYSICAL EXAM
[Normal Sclera/Conjunctiva] : normal sclera/conjunctiva [Normal Rate] : normal rate  [Regular Rhythm] : with a regular rhythm [Normal S1, S2] : normal S1 and S2 [No Edema] : there was no peripheral edema [No Focal Deficits] : no focal deficits [Normal] : affect was normal and insight and judgment were intact [de-identified] : faint murmur [de-identified] : steady gate with walker

## 2022-11-01 NOTE — HISTORY OF PRESENT ILLNESS
[Family Member] : family member [de-identified] : Annual CPE\par HTN, hyperlipidemia, on medications, needs blood work and refills. \par Stable, no side effects\par \par Saw derm - contact dermatitis, felt she might have UTI due to itching\par Incontinent - no burning or urgency. \par \par Sees rheumatology for RA\par \par Recent COVID, mild recovered well. First COVID was in hospital and   at that time from COVID when he was in hospital.

## 2022-11-03 LAB
ALBUMIN SERPL ELPH-MCNC: 4.4 G/DL
ALP BLD-CCNC: 68 U/L
ALT SERPL-CCNC: 17 U/L
ANION GAP SERPL CALC-SCNC: 12 MMOL/L
AST SERPL-CCNC: 17 U/L
BASOPHILS # BLD AUTO: 0.06 K/UL
BASOPHILS NFR BLD AUTO: 0.6 %
BILIRUB SERPL-MCNC: 0.4 MG/DL
BUN SERPL-MCNC: 15 MG/DL
CALCIUM SERPL-MCNC: 9.6 MG/DL
CHLORIDE SERPL-SCNC: 101 MMOL/L
CHOLEST SERPL-MCNC: 263 MG/DL
CO2 SERPL-SCNC: 26 MMOL/L
CREAT SERPL-MCNC: 0.92 MG/DL
EGFR: 60 ML/MIN/1.73M2
EOSINOPHIL # BLD AUTO: 0.55 K/UL
EOSINOPHIL NFR BLD AUTO: 5.8 %
ESTIMATED AVERAGE GLUCOSE: 120 MG/DL
GLUCOSE SERPL-MCNC: 102 MG/DL
HBA1C MFR BLD HPLC: 5.8 %
HCT VFR BLD CALC: 43.1 %
HDLC SERPL-MCNC: 57 MG/DL
HGB BLD-MCNC: 13.7 G/DL
IMM GRANULOCYTES NFR BLD AUTO: 0.2 %
LDLC SERPL CALC-MCNC: 175 MG/DL
LYMPHOCYTES # BLD AUTO: 2.89 K/UL
LYMPHOCYTES NFR BLD AUTO: 30.6 %
MAN DIFF?: NORMAL
MCHC RBC-ENTMCNC: 28.9 PG
MCHC RBC-ENTMCNC: 31.8 GM/DL
MCV RBC AUTO: 90.9 FL
MONOCYTES # BLD AUTO: 0.79 K/UL
MONOCYTES NFR BLD AUTO: 8.4 %
NEUTROPHILS # BLD AUTO: 5.12 K/UL
NEUTROPHILS NFR BLD AUTO: 54.4 %
NONHDLC SERPL-MCNC: 206 MG/DL
PLATELET # BLD AUTO: 263 K/UL
POTASSIUM SERPL-SCNC: 4.3 MMOL/L
PROT SERPL-MCNC: 7.1 G/DL
RBC # BLD: 4.74 M/UL
RBC # FLD: 14 %
SODIUM SERPL-SCNC: 140 MMOL/L
TRIGL SERPL-MCNC: 152 MG/DL
TSH SERPL-ACNC: 2.24 UIU/ML
WBC # FLD AUTO: 9.43 K/UL

## 2022-11-07 ENCOUNTER — RX RENEWAL (OUTPATIENT)
Age: 87
End: 2022-11-07

## 2022-11-15 LAB — BACTERIA UR CULT: ABNORMAL

## 2022-12-27 ENCOUNTER — APPOINTMENT (OUTPATIENT)
Dept: FAMILY MEDICINE | Facility: CLINIC | Age: 87
End: 2022-12-27

## 2023-01-09 ENCOUNTER — APPOINTMENT (OUTPATIENT)
Dept: CARDIOLOGY | Facility: CLINIC | Age: 88
End: 2023-01-09

## 2023-02-07 ENCOUNTER — RX RENEWAL (OUTPATIENT)
Age: 88
End: 2023-02-07

## 2023-03-26 ENCOUNTER — EMERGENCY (EMERGENCY)
Facility: HOSPITAL | Age: 88
LOS: 0 days | Discharge: HOME | End: 2023-03-26
Attending: STUDENT IN AN ORGANIZED HEALTH CARE EDUCATION/TRAINING PROGRAM
Payer: MEDICARE

## 2023-03-26 VITALS
OXYGEN SATURATION: 89 % | HEART RATE: 105 BPM | TEMPERATURE: 98 F | SYSTOLIC BLOOD PRESSURE: 148 MMHG | RESPIRATION RATE: 20 BRPM | WEIGHT: 132.94 LBS | DIASTOLIC BLOOD PRESSURE: 92 MMHG

## 2023-03-26 VITALS
RESPIRATION RATE: 19 BRPM | HEART RATE: 84 BPM | DIASTOLIC BLOOD PRESSURE: 88 MMHG | OXYGEN SATURATION: 95 % | TEMPERATURE: 98 F | SYSTOLIC BLOOD PRESSURE: 144 MMHG

## 2023-03-26 DIAGNOSIS — Z88.2 ALLERGY STATUS TO SULFONAMIDES: ICD-10-CM

## 2023-03-26 DIAGNOSIS — M17.12 UNILATERAL PRIMARY OSTEOARTHRITIS, LEFT KNEE: ICD-10-CM

## 2023-03-26 DIAGNOSIS — M06.9 RHEUMATOID ARTHRITIS, UNSPECIFIED: ICD-10-CM

## 2023-03-26 DIAGNOSIS — I10 ESSENTIAL (PRIMARY) HYPERTENSION: ICD-10-CM
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
## 2023-03-26 DIAGNOSIS — E78.00 PURE HYPERCHOLESTEROLEMIA, UNSPECIFIED: ICD-10-CM

## 2023-03-26 DIAGNOSIS — M54.9 DORSALGIA, UNSPECIFIED: ICD-10-CM

## 2023-03-26 DIAGNOSIS — Z20.822 CONTACT WITH AND (SUSPECTED) EXPOSURE TO COVID-19: ICD-10-CM

## 2023-03-26 DIAGNOSIS — M25.562 PAIN IN LEFT KNEE: ICD-10-CM
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
## 2023-03-26 DIAGNOSIS — Z86.16 PERSONAL HISTORY OF COVID-19: ICD-10-CM

## 2023-03-26 DIAGNOSIS — M25.462 EFFUSION, LEFT KNEE: ICD-10-CM

## 2023-03-26 LAB
ALBUMIN SERPL ELPH-MCNC: 3.5 G/DL — SIGNIFICANT CHANGE UP (ref 3.3–5)
ALP SERPL-CCNC: 84 U/L — SIGNIFICANT CHANGE UP (ref 40–120)
ALT FLD-CCNC: 29 U/L — SIGNIFICANT CHANGE UP (ref 12–78)
ANION GAP SERPL CALC-SCNC: 4 MMOL/L — LOW (ref 5–17)
AST SERPL-CCNC: 26 U/L — SIGNIFICANT CHANGE UP (ref 15–37)
BASOPHILS # BLD AUTO: 0.06 K/UL — SIGNIFICANT CHANGE UP (ref 0–0.2)
BASOPHILS NFR BLD AUTO: 0.5 % — SIGNIFICANT CHANGE UP (ref 0–2)
BILIRUB SERPL-MCNC: 0.4 MG/DL — SIGNIFICANT CHANGE UP (ref 0.2–1.2)
BUN SERPL-MCNC: 23 MG/DL — SIGNIFICANT CHANGE UP (ref 7–23)
CALCIUM SERPL-MCNC: 9.5 MG/DL — SIGNIFICANT CHANGE UP (ref 8.5–10.1)
CHLORIDE SERPL-SCNC: 103 MMOL/L — SIGNIFICANT CHANGE UP (ref 96–108)
CO2 SERPL-SCNC: 31 MMOL/L — SIGNIFICANT CHANGE UP (ref 22–31)
CREAT SERPL-MCNC: 1.02 MG/DL — SIGNIFICANT CHANGE UP (ref 0.5–1.3)
CRP SERPL-MCNC: <3 MG/L — SIGNIFICANT CHANGE UP
EGFR: 53 ML/MIN/1.73M2 — LOW
EOSINOPHIL # BLD AUTO: 0.26 K/UL — SIGNIFICANT CHANGE UP (ref 0–0.5)
EOSINOPHIL NFR BLD AUTO: 2.1 % — SIGNIFICANT CHANGE UP (ref 0–6)
FLUAV AG NPH QL: SIGNIFICANT CHANGE UP
FLUBV AG NPH QL: SIGNIFICANT CHANGE UP
GLUCOSE SERPL-MCNC: 105 MG/DL — HIGH (ref 70–99)
HCT VFR BLD CALC: 42.5 % — SIGNIFICANT CHANGE UP (ref 34.5–45)
HGB BLD-MCNC: 13.6 G/DL — SIGNIFICANT CHANGE UP (ref 11.5–15.5)
IMM GRANULOCYTES NFR BLD AUTO: 0.5 % — SIGNIFICANT CHANGE UP (ref 0–0.9)
LYMPHOCYTES # BLD AUTO: 25 % — SIGNIFICANT CHANGE UP (ref 13–44)
LYMPHOCYTES # BLD AUTO: 3.08 K/UL — SIGNIFICANT CHANGE UP (ref 1–3.3)
MCHC RBC-ENTMCNC: 28.5 PG — SIGNIFICANT CHANGE UP (ref 27–34)
MCHC RBC-ENTMCNC: 32 GM/DL — SIGNIFICANT CHANGE UP (ref 32–36)
MCV RBC AUTO: 89.1 FL — SIGNIFICANT CHANGE UP (ref 80–100)
MONOCYTES # BLD AUTO: 1.09 K/UL — HIGH (ref 0–0.9)
MONOCYTES NFR BLD AUTO: 8.8 % — SIGNIFICANT CHANGE UP (ref 2–14)
NEUTROPHILS # BLD AUTO: 7.79 K/UL — HIGH (ref 1.8–7.4)
NEUTROPHILS NFR BLD AUTO: 63.1 % — SIGNIFICANT CHANGE UP (ref 43–77)
PLATELET # BLD AUTO: 262 K/UL — SIGNIFICANT CHANGE UP (ref 150–400)
POTASSIUM SERPL-MCNC: 4 MMOL/L — SIGNIFICANT CHANGE UP (ref 3.5–5.3)
POTASSIUM SERPL-SCNC: 4 MMOL/L — SIGNIFICANT CHANGE UP (ref 3.5–5.3)
PROT SERPL-MCNC: 7.4 GM/DL — SIGNIFICANT CHANGE UP (ref 6–8.3)
RBC # BLD: 4.77 M/UL — SIGNIFICANT CHANGE UP (ref 3.8–5.2)
RBC # FLD: 14.8 % — HIGH (ref 10.3–14.5)
RSV RNA NPH QL NAA+NON-PROBE: SIGNIFICANT CHANGE UP
SARS-COV-2 RNA SPEC QL NAA+PROBE: SIGNIFICANT CHANGE UP
SODIUM SERPL-SCNC: 138 MMOL/L — SIGNIFICANT CHANGE UP (ref 135–145)
WBC # BLD: 12.34 K/UL — HIGH (ref 3.8–10.5)
WBC # FLD AUTO: 12.34 K/UL — HIGH (ref 3.8–10.5)

## 2023-03-26 PROCEDURE — 93010 ELECTROCARDIOGRAM REPORT: CPT

## 2023-03-26 PROCEDURE — 85025 COMPLETE CBC W/AUTO DIFF WBC: CPT

## 2023-03-26 PROCEDURE — 73700 CT LOWER EXTREMITY W/O DYE: CPT | Mod: 26,LT,MA

## 2023-03-26 PROCEDURE — 99285 EMERGENCY DEPT VISIT HI MDM: CPT | Mod: 25

## 2023-03-26 PROCEDURE — 36415 COLL VENOUS BLD VENIPUNCTURE: CPT

## 2023-03-26 PROCEDURE — 0241U: CPT

## 2023-03-26 PROCEDURE — 86140 C-REACTIVE PROTEIN: CPT

## 2023-03-26 PROCEDURE — 71250 CT THORAX DX C-: CPT | Mod: 26,MA

## 2023-03-26 PROCEDURE — 93005 ELECTROCARDIOGRAM TRACING: CPT

## 2023-03-26 PROCEDURE — 99284 EMERGENCY DEPT VISIT MOD MDM: CPT

## 2023-03-26 PROCEDURE — 80053 COMPREHEN METABOLIC PANEL: CPT

## 2023-03-26 PROCEDURE — 73700 CT LOWER EXTREMITY W/O DYE: CPT | Mod: MA,LT

## 2023-03-26 PROCEDURE — 72131 CT LUMBAR SPINE W/O DYE: CPT | Mod: 26,MA

## 2023-03-26 PROCEDURE — 71250 CT THORAX DX C-: CPT | Mod: MA

## 2023-03-26 PROCEDURE — 72131 CT LUMBAR SPINE W/O DYE: CPT | Mod: MA

## 2023-03-26 PROCEDURE — 76376 3D RENDER W/INTRP POSTPROCES: CPT

## 2023-03-26 PROCEDURE — 76376 3D RENDER W/INTRP POSTPROCES: CPT | Mod: 26

## 2023-03-26 RX ORDER — ACETAMINOPHEN 500 MG
650 TABLET ORAL ONCE
Refills: 0 | Status: COMPLETED | OUTPATIENT
Start: 2023-03-26 | End: 2023-03-26

## 2023-03-26 RX ADMIN — Medication 650 MILLIGRAM(S): at 12:52

## 2023-03-26 NOTE — ED ADULT NURSE NOTE - CHIEF COMPLAINT QUOTE
pt presents to ED due to complaints of left knee pain s/p fall no blood thinners complaints left knee pain - LOc denies hitting head

## 2023-03-26 NOTE — ED PROVIDER NOTE - NSFOLLOWUPINSTRUCTIONS_ED_ALL_ED_FT
You were seen in the Emergency Department for left knee pain. Your CT scan just shows arthritis of the knee.    - Please follow up with your Primary Care Doctor and/or orthopedist.    - Tylenol up to 650 mg every 8 hours as needed for pain.    - Take any prescribed medications as instructed:    - Be sure to return to the ED if you develop new or worsening symptoms.    - Specific signs and symptoms to be vigilant of: leg numbness or tingling, leg weakness, fecal or urinary incontinence or retention, shooting pains down the back of the legs, fever, chills, prolonged or worsening back pain, inflammation of your joints, difficulty moving your joints, difficulty walking.

## 2023-03-26 NOTE — ED PROVIDER NOTE - PATIENT PORTAL LINK FT
You can access the FollowMyHealth Patient Portal offered by [facility] by registering at the following website: [email or web address]. By joining [employer]’s FollowMyHealth portal, you will also be able to view your health information using other applications (apps) compatible with our system.

## 2023-03-26 NOTE — ED ADULT NURSE NOTE - OBJECTIVE STATEMENT
p/w complaint of mechanical fall this morning. states: "my knees gave up" -LOC, -head-strike. c/o Left knee pain.

## 2023-03-26 NOTE — ED ADULT TRIAGE NOTE - BP NONINVASIVE DIASTOLIC (MM HG)
89 As per pt report, resides in private house with mandatory outside steps, full flight to bedroom/full bath. 1/2 bath on first floor.

## 2023-03-26 NOTE — ED PROVIDER NOTE - CLINICAL SUMMARY MEDICAL DECISION MAKING FREE TEXT BOX
History and physical as documented above. My concern for proceeding alteration of her health prior to fall is low. Pt is of sound mind, has normal vital signs, and has overall nonfocal reassuring exam. I will limit my workup to basic labs, CT of spine and left knee to asses for traumatic injury. However if everything is normal, will dc.

## 2023-03-26 NOTE — ED PROVIDER NOTE - PROGRESS NOTE DETAILS
Patient's CT just showing L knee arthritis. No traumatic injuries. All lab and imaging results reviewed with the patient. The patient was provided an opportunity to ask questions and voice their concerns, all of which were addressed at length. Strict return precautions discussed with the patient. The patient verbalized understanding of the plan and agrees to follow through with it. The patient is safe for discharge at this time with close outpatient follow up.

## 2023-03-26 NOTE — ED ADULT TRIAGE NOTE - CHIEF COMPLAINT QUOTE
pt presents to ED due to complaints of left knee pain s/p fall no blood thinners complaints left knee pain pt presents to ED due to complaints of left knee pain s/p fall no blood thinners complaints left knee pain - LOc denies hitting head

## 2023-03-26 NOTE — ED PROVIDER NOTE - NSICDXPASTMEDICALHX_GEN_ALL_CORE_FT
PAST MEDICAL HISTORY:  Arthritis     COVID-19     High cholesterol     HTN (hypertension)     Rheumatoid arthritis

## 2023-03-26 NOTE — ED PROVIDER NOTE - NS ED ROS FT
GENERAL: no fever, chills, fatigue, weight loss, night sweats  HEENT: no eye pain, discharge, conjunctivitis, ear pain, hearing loss, rhinorrhea, congestion, throat pain  CARDIAC: no chest pain, palpitations, lightheadedness, syncope  PULM: no dyspnea, wheezing  GI: no abdominal pain, nausea, vomiting, diarrhea, constipation, melena, hematochezia  : no urinary dysuria, frequency, incontinence, hematuria  NEURO: no headache, changes in vision, motor weakness, sensory changes  MSK: (+) back soreness   SKIN: no rashes  HEME: no active bleeding, excessive bruising

## 2023-03-26 NOTE — ED PROVIDER NOTE - OBJECTIVE STATEMENT
87 y/o female with h/on HTN, HLD, rheumatoid arthritis, COVID presents to the ED s/p mechanical fall at home. Pt stood up from the toilet when her left knee gave out. Pt fell backwards and landed on her back. Denies head strike. No LOC. Back slightly sore, no other pain or complaints at this time. Pt comes in to evaluate left knee after incident. Denies CP, SOB, palpitations. syncope. Normally uses a walker to ambulate. Lives with her daughter at home. No major surgeries. NKDA Nonsmoker.

## 2023-03-26 NOTE — ED PROVIDER NOTE - PHYSICAL EXAMINATION
GENERAL: non-toxic appearing, in NAD  HEAD: atraumatic, normocephalic  EYES: vision grossly intact, no conjunctivitis or discharge  EARS: hearing grossly intact  NOSE: no nasal discharge, epistaxis   CARDIAC: RRR, normal S1S2,  no appreciable murmurs, no cyanosis, cap refill < 2 seconds  PULM: no respiratory distress, oxygen saturation on RA wnl, CTAB, no crackles, rales, rhonchi, or wheezing  GI: abdomen nondistended, soft, nontender, no guarding or rebound tenderness, no palpable masses  NEURO: awake and alert, follows commands, normal speech, PERRLA, EOMI, no focal motor or sensory deficits, normal gait  MSK: spine appears normal, no gross deformities of extremities. + left knee swelling, mild midline tenderness of thoracic spine  EXT: no peripheral edema, calf tenderness, redness or swelling  SKIN: warm, dry, and intact, no rashes  PSYCH: appropriate mood and affect

## 2023-04-03 ENCOUNTER — APPOINTMENT (OUTPATIENT)
Dept: FAMILY MEDICINE | Facility: CLINIC | Age: 88
End: 2023-04-03
Payer: MEDICARE

## 2023-04-03 VITALS
DIASTOLIC BLOOD PRESSURE: 72 MMHG | OXYGEN SATURATION: 95 % | SYSTOLIC BLOOD PRESSURE: 104 MMHG | TEMPERATURE: 98.1 F | HEART RATE: 107 BPM | WEIGHT: 131 LBS | HEIGHT: 60.5 IN | BODY MASS INDEX: 25.05 KG/M2

## 2023-04-03 DIAGNOSIS — L29.9 PRURITUS, UNSPECIFIED: ICD-10-CM

## 2023-04-03 DIAGNOSIS — M06.9 RHEUMATOID ARTHRITIS, UNSPECIFIED: ICD-10-CM

## 2023-04-03 PROCEDURE — 99214 OFFICE O/P EST MOD 30 MIN: CPT

## 2023-04-03 RX ORDER — ADALIMUMAB 40MG/0.4ML
40 KIT SUBCUTANEOUS
Refills: 0 | Status: ACTIVE | COMMUNITY

## 2023-04-03 RX ORDER — PREDNISONE 5 MG/1
5 TABLET ORAL
Refills: 0 | Status: COMPLETED | COMMUNITY
End: 2023-04-03

## 2023-04-03 RX ORDER — HYDROXYZINE HYDROCHLORIDE 25 MG/1
25 TABLET ORAL
Qty: 30 | Refills: 0 | Status: COMPLETED | COMMUNITY
Start: 2022-06-23 | End: 2023-04-03

## 2023-04-03 RX ORDER — CEFUROXIME AXETIL 250 MG/1
250 TABLET ORAL
Qty: 14 | Refills: 0 | Status: COMPLETED | COMMUNITY
Start: 2022-11-01 | End: 2023-04-03

## 2023-04-03 RX ORDER — HYDROCODONE BITARTRATE AND ACETAMINOPHEN 5; 325 MG/1; MG/1
5-325 TABLET ORAL
Qty: 50 | Refills: 0 | Status: COMPLETED | COMMUNITY
Start: 2023-01-13

## 2023-04-03 RX ORDER — HYDROXYZINE HYDROCHLORIDE 10 MG/1
10 TABLET ORAL
Qty: 60 | Refills: 0 | Status: ACTIVE | COMMUNITY
Start: 2023-03-26

## 2023-04-03 NOTE — PLAN
[FreeTextEntry1] : Pruritus\par - cont hydroxyzine 10mg. Counseled on possible dizziness. Pt states it is the only medication to help her sleep\par \par Depression/anxiety\par - low dose lexapro\par \par RA\par - stable\par \par fu in 3-6 months

## 2023-04-03 NOTE — PHYSICAL EXAM
[No Acute Distress] : no acute distress [Well-Appearing] : well-appearing [Normal Sclera/Conjunctiva] : normal sclera/conjunctiva [Normal Outer Ear/Nose] : the outer ears and nose were normal in appearance [No Respiratory Distress] : no respiratory distress  [No Accessory Muscle Use] : no accessory muscle use [Clear to Auscultation] : lungs were clear to auscultation bilaterally [Normal Rate] : normal rate  [Regular Rhythm] : with a regular rhythm [Normal Affect] : the affect was normal [Normal Insight/Judgement] : insight and judgment were intact [de-identified] : +1/5 systolic murmur

## 2023-04-03 NOTE — HISTORY OF PRESENT ILLNESS
[FreeTextEntry1] : anxiety [de-identified] : 88 year old female presents for anxiety. States she lives with her daughter who now has a boyfriend and feels she is not wanted in the house. Is interested in lexapro. \par She is also still having itching episodes at night not allowing her to sleep. Has been to multiple dermatologists with no relief.She says hydroxyzine does help but was getting dizzy on 25mg. She took 10mg which helped and she did not have dizziness in the morning.

## 2023-04-08 ENCOUNTER — APPOINTMENT (OUTPATIENT)
Dept: FAMILY MEDICINE | Facility: CLINIC | Age: 88
End: 2023-04-08

## 2023-04-17 ENCOUNTER — APPOINTMENT (OUTPATIENT)
Dept: OPHTHALMOLOGY | Facility: CLINIC | Age: 88
End: 2023-04-17
Payer: MEDICARE

## 2023-04-17 ENCOUNTER — NON-APPOINTMENT (OUTPATIENT)
Age: 88
End: 2023-04-17

## 2023-04-17 PROCEDURE — 92014 COMPRE OPH EXAM EST PT 1/>: CPT

## 2023-04-17 PROCEDURE — 92134 CPTRZ OPH DX IMG PST SGM RTA: CPT

## 2023-07-19 ENCOUNTER — APPOINTMENT (OUTPATIENT)
Dept: OPHTHALMOLOGY | Facility: CLINIC | Age: 88
End: 2023-07-19
Payer: MEDICARE

## 2023-07-19 ENCOUNTER — NON-APPOINTMENT (OUTPATIENT)
Age: 88
End: 2023-07-19

## 2023-07-19 PROCEDURE — 92134 CPTRZ OPH DX IMG PST SGM RTA: CPT

## 2023-07-19 PROCEDURE — 92014 COMPRE OPH EXAM EST PT 1/>: CPT

## 2023-07-27 ENCOUNTER — APPOINTMENT (OUTPATIENT)
Dept: OPHTHALMOLOGY | Facility: CLINIC | Age: 88
End: 2023-07-27
Payer: MEDICARE

## 2023-07-27 ENCOUNTER — NON-APPOINTMENT (OUTPATIENT)
Age: 88
End: 2023-07-27

## 2023-07-27 PROCEDURE — 92134 CPTRZ OPH DX IMG PST SGM RTA: CPT

## 2023-07-27 PROCEDURE — 92014 COMPRE OPH EXAM EST PT 1/>: CPT

## 2023-08-19 ENCOUNTER — RX RENEWAL (OUTPATIENT)
Age: 88
End: 2023-08-19

## 2023-08-22 ENCOUNTER — APPOINTMENT (OUTPATIENT)
Dept: RADIOLOGY | Facility: CLINIC | Age: 88
End: 2023-08-22

## 2023-08-25 ENCOUNTER — APPOINTMENT (OUTPATIENT)
Dept: FAMILY MEDICINE | Facility: CLINIC | Age: 88
End: 2023-08-25
Payer: MEDICARE

## 2023-08-25 DIAGNOSIS — F43.20 ADJUSTMENT DISORDER, UNSPECIFIED: ICD-10-CM

## 2023-08-25 DIAGNOSIS — Z63.4 ADJUSTMENT DISORDER, UNSPECIFIED: ICD-10-CM

## 2023-08-25 PROCEDURE — 99442: CPT

## 2023-08-25 RX ORDER — ESCITALOPRAM OXALATE 5 MG/1
5 TABLET ORAL
Qty: 90 | Refills: 1 | Status: COMPLETED | COMMUNITY
Start: 2023-04-03 | End: 2023-08-25

## 2023-08-25 SDOH — SOCIAL STABILITY - SOCIAL INSECURITY: DISSAPEARANCE AND DEATH OF FAMILY MEMBER: Z63.4

## 2023-09-19 RX ORDER — HYDROXYZINE HYDROCHLORIDE 10 MG/1
10 TABLET ORAL 3 TIMES DAILY
Qty: 30 | Refills: 0 | Status: ACTIVE | COMMUNITY
Start: 2023-07-25 | End: 1900-01-01

## 2023-10-18 ENCOUNTER — RX RENEWAL (OUTPATIENT)
Age: 88
End: 2023-10-18

## 2023-10-23 ENCOUNTER — APPOINTMENT (OUTPATIENT)
Dept: OPHTHALMOLOGY | Facility: CLINIC | Age: 88
End: 2023-10-23
Payer: MEDICARE

## 2023-10-23 ENCOUNTER — NON-APPOINTMENT (OUTPATIENT)
Age: 88
End: 2023-10-23

## 2023-10-23 PROCEDURE — 92014 COMPRE OPH EXAM EST PT 1/>: CPT

## 2023-11-02 ENCOUNTER — APPOINTMENT (OUTPATIENT)
Dept: FAMILY MEDICINE | Facility: CLINIC | Age: 88
End: 2023-11-02
Payer: MEDICARE

## 2023-11-02 ENCOUNTER — NON-APPOINTMENT (OUTPATIENT)
Age: 88
End: 2023-11-02

## 2023-11-02 VITALS
OXYGEN SATURATION: 96 % | DIASTOLIC BLOOD PRESSURE: 82 MMHG | TEMPERATURE: 97.2 F | SYSTOLIC BLOOD PRESSURE: 140 MMHG | HEART RATE: 84 BPM | BODY MASS INDEX: 25.44 KG/M2 | WEIGHT: 133 LBS | HEIGHT: 60.5 IN

## 2023-11-02 VITALS — SYSTOLIC BLOOD PRESSURE: 136 MMHG | DIASTOLIC BLOOD PRESSURE: 80 MMHG

## 2023-11-02 DIAGNOSIS — F41.9 ANXIETY DISORDER, UNSPECIFIED: ICD-10-CM

## 2023-11-02 DIAGNOSIS — F32.A DEPRESSION, UNSPECIFIED: ICD-10-CM

## 2023-11-02 DIAGNOSIS — Z13.220 ENCOUNTER FOR SCREENING FOR LIPOID DISORDERS: ICD-10-CM

## 2023-11-02 DIAGNOSIS — Z13.21 ENCOUNTER FOR SCREENING FOR NUTRITIONAL DISORDER: ICD-10-CM

## 2023-11-02 DIAGNOSIS — E78.5 HYPERLIPIDEMIA, UNSPECIFIED: ICD-10-CM

## 2023-11-02 DIAGNOSIS — I10 ESSENTIAL (PRIMARY) HYPERTENSION: ICD-10-CM

## 2023-11-02 DIAGNOSIS — Z00.00 ENCOUNTER FOR GENERAL ADULT MEDICAL EXAMINATION W/OUT ABNORMAL FINDINGS: ICD-10-CM

## 2023-11-02 DIAGNOSIS — Z13.1 ENCOUNTER FOR SCREENING FOR DIABETES MELLITUS: ICD-10-CM

## 2023-11-02 DIAGNOSIS — R94.31 ABNORMAL ELECTROCARDIOGRAM [ECG] [EKG]: ICD-10-CM

## 2023-11-02 PROCEDURE — G0439: CPT

## 2023-11-02 PROCEDURE — 90662 IIV NO PRSV INCREASED AG IM: CPT

## 2023-11-02 PROCEDURE — G0008: CPT

## 2023-11-02 PROCEDURE — 99214 OFFICE O/P EST MOD 30 MIN: CPT | Mod: 25

## 2023-11-02 RX ORDER — LOSARTAN POTASSIUM 100 MG/1
100 TABLET, FILM COATED ORAL
Qty: 90 | Refills: 1 | Status: ACTIVE | COMMUNITY
Start: 2021-10-03 | End: 1900-01-01

## 2023-11-02 RX ORDER — TRIAMCINOLONE ACETONIDE 1 MG/G
0.1 CREAM TOPICAL
Qty: 1 | Refills: 1 | Status: COMPLETED | COMMUNITY
Start: 2021-11-02 | End: 2023-11-02

## 2023-11-02 RX ORDER — NITROFURANTOIN (MONOHYDRATE/MACROCRYSTALS) 25; 75 MG/1; MG/1
100 CAPSULE ORAL TWICE DAILY
Qty: 14 | Refills: 0 | Status: COMPLETED | COMMUNITY
Start: 2023-05-18 | End: 2023-11-02

## 2023-11-02 RX ORDER — BUPROPION HYDROCHLORIDE 150 MG/1
150 TABLET, EXTENDED RELEASE ORAL
Qty: 90 | Refills: 1 | Status: ACTIVE | COMMUNITY
Start: 2023-08-25 | End: 1900-01-01

## 2023-11-03 LAB
25(OH)D3 SERPL-MCNC: 25.9 NG/ML
ALBUMIN SERPL ELPH-MCNC: 4.1 G/DL
ALP BLD-CCNC: 76 U/L
ALT SERPL-CCNC: 13 U/L
ANION GAP SERPL CALC-SCNC: 12 MMOL/L
AST SERPL-CCNC: 16 U/L
BASOPHILS # BLD AUTO: 0.06 K/UL
BASOPHILS NFR BLD AUTO: 0.6 %
BILIRUB SERPL-MCNC: 0.3 MG/DL
BUN SERPL-MCNC: 20 MG/DL
CALCIUM SERPL-MCNC: 9.4 MG/DL
CHLORIDE SERPL-SCNC: 101 MMOL/L
CHOLEST SERPL-MCNC: 211 MG/DL
CO2 SERPL-SCNC: 26 MMOL/L
CREAT SERPL-MCNC: 0.99 MG/DL
EGFR: 54 ML/MIN/1.73M2
EOSINOPHIL # BLD AUTO: 0.37 K/UL
EOSINOPHIL NFR BLD AUTO: 4 %
ESTIMATED AVERAGE GLUCOSE: 126 MG/DL
GLUCOSE SERPL-MCNC: 94 MG/DL
HBA1C MFR BLD HPLC: 6 %
HCT VFR BLD CALC: 42.3 %
HDLC SERPL-MCNC: 55 MG/DL
HGB BLD-MCNC: 13.3 G/DL
IMM GRANULOCYTES NFR BLD AUTO: 0.5 %
LDLC SERPL CALC-MCNC: 133 MG/DL
LYMPHOCYTES # BLD AUTO: 2.62 K/UL
LYMPHOCYTES NFR BLD AUTO: 28.2 %
MAN DIFF?: NORMAL
MCHC RBC-ENTMCNC: 28.2 PG
MCHC RBC-ENTMCNC: 31.4 GM/DL
MCV RBC AUTO: 89.8 FL
MONOCYTES # BLD AUTO: 1.1 K/UL
MONOCYTES NFR BLD AUTO: 11.9 %
NEUTROPHILS # BLD AUTO: 5.08 K/UL
NEUTROPHILS NFR BLD AUTO: 54.8 %
NONHDLC SERPL-MCNC: 156 MG/DL
PLATELET # BLD AUTO: 299 K/UL
POTASSIUM SERPL-SCNC: 4.3 MMOL/L
PROT SERPL-MCNC: 7 G/DL
RBC # BLD: 4.71 M/UL
RBC # FLD: 14.5 %
SODIUM SERPL-SCNC: 139 MMOL/L
TRIGL SERPL-MCNC: 130 MG/DL
TSH SERPL-ACNC: 1.75 UIU/ML
WBC # FLD AUTO: 9.28 K/UL

## 2023-11-03 RX ORDER — PRAVASTATIN SODIUM 40 MG/1
40 TABLET ORAL
Qty: 90 | Refills: 1 | Status: ACTIVE | COMMUNITY
Start: 2021-03-29 | End: 1900-01-01

## 2023-12-19 ENCOUNTER — RX RENEWAL (OUTPATIENT)
Age: 88
End: 2023-12-19

## 2024-01-09 ENCOUNTER — RX RENEWAL (OUTPATIENT)
Age: 89
End: 2024-01-09

## 2024-01-09 RX ORDER — HYDROXYZINE HYDROCHLORIDE 25 MG/1
25 TABLET ORAL
Qty: 30 | Refills: 1 | Status: ACTIVE | COMMUNITY
Start: 2022-11-07 | End: 1900-01-01

## 2024-01-11 ENCOUNTER — APPOINTMENT (OUTPATIENT)
Dept: FAMILY MEDICINE | Facility: CLINIC | Age: 89
End: 2024-01-11
Payer: MEDICARE

## 2024-01-11 VITALS
HEIGHT: 60 IN | HEART RATE: 124 BPM | SYSTOLIC BLOOD PRESSURE: 150 MMHG | BODY MASS INDEX: 26.31 KG/M2 | TEMPERATURE: 97.6 F | DIASTOLIC BLOOD PRESSURE: 80 MMHG | WEIGHT: 134 LBS | OXYGEN SATURATION: 95 %

## 2024-01-11 VITALS — SYSTOLIC BLOOD PRESSURE: 136 MMHG | DIASTOLIC BLOOD PRESSURE: 80 MMHG

## 2024-01-11 DIAGNOSIS — J18.9 PNEUMONIA, UNSPECIFIED ORGANISM: ICD-10-CM

## 2024-01-11 PROCEDURE — 99213 OFFICE O/P EST LOW 20 MIN: CPT

## 2024-01-11 RX ORDER — AMOXICILLIN AND CLAVULANATE POTASSIUM 875; 125 MG/1; MG/1
875-125 TABLET, COATED ORAL
Qty: 14 | Refills: 0 | Status: ACTIVE | COMMUNITY
Start: 2024-01-11 | End: 1900-01-01

## 2024-01-11 NOTE — HISTORY OF PRESENT ILLNESS
[FreeTextEntry8] : 89 year old female presents for pna. States she had CXR done for admission to [facility] facility and was told to f/u for PNA. She feels well. No fever, cough, SOB and feels well

## 2024-01-25 ENCOUNTER — APPOINTMENT (OUTPATIENT)
Dept: OPHTHALMOLOGY | Facility: CLINIC | Age: 89
End: 2024-01-25
Payer: MEDICARE

## 2024-01-25 ENCOUNTER — NON-APPOINTMENT (OUTPATIENT)
Age: 89
End: 2024-01-25

## 2024-01-25 PROCEDURE — 92014 COMPRE OPH EXAM EST PT 1/>: CPT

## 2024-01-25 PROCEDURE — 92134 CPTRZ OPH DX IMG PST SGM RTA: CPT

## 2024-01-25 NOTE — PROGRESS NOTE ADULT - ATTENDING COMMENTS
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
87 year old female, PMH of COVID, HTN, HDL rheumatoid arthritis on Humira and prednisone c/o weakness and fatigue.      # Acute metabolic encephalopathy ,Leukocytosis   -Under investigation. Probably during due to infection  -Blood and urine culture pending   -Iv ceftriaxone  -ID referal  -We will closely monitor her
87 year old female, PMH of COVID, HTN, HDL rheumatoid arthritis on Humira and prednisone c/o weakness and fatigue.    #Acute Metabolic Encephalopathy 2/2 acute sepsis  - sepsis resolved  - clinically improving- no fevers, leukocytosis downtrending  - Blood cultures on 9/27 + for E. Coli, repeat cultures sent today- follow up  - continue rocephin  - TTE on 9/27: EF 50%, mild MR, TR  - trend  labs, monitor vitals  - Fall precautions  - PT consulted
As above, pt seen and examined with house staff and treatment plan formulated on rounds    See Above and Attending Note
87 year old female, PMH of COVID, HTN, HDL rheumatoid arthritis on Humira and prednisone c/o weakness and fatigue.      #Acute Metabolic Encephalopathy 2/2 acute sepsis  #Cystitis   - sepsis resolved  - clinically improving- no fevers, leukocytosis downtrending  - Blood cultures on 9/27 + for E. Coli, repeat cultures sent today- follow up  - continue rocephin  - TTE on 9/27: EF 50%, mild MR, TR  - trend  labs, monitor vitals  - Fall precautions  - PT
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    # Bacteriamia  -Possibly due to cystitis  -Blood culture- Positive for gram negative rods  - Continue Iv ceftriaxone 2gm iv every 24 hrs  -ID referral appreciated   -We will closely monitor
family

## 2024-03-05 NOTE — DISCHARGE NOTE PROVIDER - NSDCMRMEDTOKEN_GEN_ALL_CORE_FT
atorvastatin 10 mg oral tablet: 1 tab(s) orally once a day (at bedtime)  cefuroxime 250 mg oral tablet: 1 tab(s) orally every 12 hours  Centrum oral tablet: 1 tab(s) orally once a day  Humira 40 mg/0.8 mL subcutaneous kit: 40 milligram(s) subcutaneous every 2 weeks  losartan 25 mg oral tablet: 1 tab(s) orally once a day  predniSONE 5 mg oral tablet: 1 tab(s) orally 2 times a day for 3 days, then 1 tab(s) orally once a day for 5 days if needed for acute RA flare  Tylenol Extra Strength 500 mg oral tablet: 2 tab(s) orally every 6 hours, As Needed   no atorvastatin 10 mg oral tablet: 1 tab(s) orally once a day (at bedtime)  cefuroxime 250 mg oral tablet: 1 tab(s) orally every 12 hours  Centrum oral tablet: 1 tab(s) orally once a day  Humira 40 mg/0.8 mL subcutaneous kit: 40 milligram(s) subcutaneous every 2 weeks  losartan 50 mg oral tablet: 1 tab(s) orally once a day  Tylenol Extra Strength 500 mg oral tablet: 2 tab(s) orally every 6 hours, As Needed

## 2024-05-30 ENCOUNTER — NON-APPOINTMENT (OUTPATIENT)
Age: 89
End: 2024-05-30

## 2024-05-30 ENCOUNTER — APPOINTMENT (OUTPATIENT)
Dept: OPHTHALMOLOGY | Facility: CLINIC | Age: 89
End: 2024-05-30
Payer: MEDICARE

## 2024-05-30 PROCEDURE — 92134 CPTRZ OPH DX IMG PST SGM RTA: CPT

## 2024-05-30 PROCEDURE — 92014 COMPRE OPH EXAM EST PT 1/>: CPT

## 2024-07-09 ENCOUNTER — NON-APPOINTMENT (OUTPATIENT)
Age: 89
End: 2024-07-09

## 2024-07-09 ENCOUNTER — APPOINTMENT (OUTPATIENT)
Dept: OPHTHALMOLOGY | Facility: CLINIC | Age: 89
End: 2024-07-09
Payer: MEDICARE

## 2024-07-09 PROCEDURE — 92014 COMPRE OPH EXAM EST PT 1/>: CPT

## 2024-07-09 PROCEDURE — 92134 CPTRZ OPH DX IMG PST SGM RTA: CPT

## 2024-11-27 ENCOUNTER — APPOINTMENT (OUTPATIENT)
Dept: OPHTHALMOLOGY | Facility: CLINIC | Age: 89
End: 2024-11-27

## 2025-02-03 NOTE — ED PROVIDER NOTE - IV ALTEPLASE EXCL REL HIDDEN
show
Bed in lowest position, wheels locked, appropriate side rails in place/Call bell, personal items and telephone in reach/Instruct patient to call for assistance before getting out of bed or chair/Non-slip footwear when patient is out of bed/[place] to call system/Physically safe environment - no spills, clutter or unnecessary equipment/Purposeful Proactive Rounding/Room/bathroom lighting operational, light cord in reach